# Patient Record
Sex: FEMALE | Race: BLACK OR AFRICAN AMERICAN | Employment: OTHER | URBAN - METROPOLITAN AREA
[De-identification: names, ages, dates, MRNs, and addresses within clinical notes are randomized per-mention and may not be internally consistent; named-entity substitution may affect disease eponyms.]

---

## 2020-08-06 ENCOUNTER — HOSPITAL ENCOUNTER (OUTPATIENT)
Age: 75
Discharge: HOME | End: 2020-08-06
Payer: COMMERCIAL

## 2020-08-06 LAB
INR BLD: 1.66 (ref 0.86–1.14)
PROTHROMBIN TIME: 19.4 SEC (ref 10–13.2)

## 2020-08-06 PROCEDURE — 85610 PROTHROMBIN TIME: CPT

## 2020-08-06 PROCEDURE — 36415 COLL VENOUS BLD VENIPUNCTURE: CPT

## 2021-05-10 ENCOUNTER — APPOINTMENT (OUTPATIENT)
Dept: GENERAL RADIOLOGY | Age: 76
DRG: 189 | End: 2021-05-10
Payer: COMMERCIAL

## 2021-05-10 ENCOUNTER — HOSPITAL ENCOUNTER (INPATIENT)
Age: 76
LOS: 3 days | Discharge: ANOTHER HOSPITAL | DRG: 189 | End: 2021-05-13
Attending: STUDENT IN AN ORGANIZED HEALTH CARE EDUCATION/TRAINING PROGRAM | Admitting: INTERNAL MEDICINE
Payer: COMMERCIAL

## 2021-05-10 DIAGNOSIS — J44.1 COPD EXACERBATION (HCC): ICD-10-CM

## 2021-05-10 DIAGNOSIS — E87.29 RESPIRATORY ACIDOSIS: ICD-10-CM

## 2021-05-10 DIAGNOSIS — R06.00 DYSPNEA AND RESPIRATORY ABNORMALITIES: Primary | ICD-10-CM

## 2021-05-10 DIAGNOSIS — R06.89 DYSPNEA AND RESPIRATORY ABNORMALITIES: Primary | ICD-10-CM

## 2021-05-10 DIAGNOSIS — R09.02 HYPOXIA: ICD-10-CM

## 2021-05-10 PROBLEM — J96.01 ACUTE RESPIRATORY FAILURE WITH HYPOXIA AND HYPERCAPNIA (HCC): Status: ACTIVE | Noted: 2021-05-10

## 2021-05-10 PROBLEM — I82.409 DVT (DEEP VENOUS THROMBOSIS) (HCC): Status: ACTIVE | Noted: 2021-05-10

## 2021-05-10 PROBLEM — Z99.2 ESRD ON DIALYSIS (HCC): Status: ACTIVE | Noted: 2021-05-10

## 2021-05-10 PROBLEM — E87.5 HYPERKALEMIA: Status: ACTIVE | Noted: 2021-05-10

## 2021-05-10 PROBLEM — E66.9 OBESITY (BMI 30-39.9): Status: ACTIVE | Noted: 2021-05-10

## 2021-05-10 PROBLEM — I73.9 PVD (PERIPHERAL VASCULAR DISEASE) (HCC): Status: ACTIVE | Noted: 2021-05-10

## 2021-05-10 PROBLEM — R77.8 ELEVATED TROPONIN: Status: ACTIVE | Noted: 2021-05-10

## 2021-05-10 PROBLEM — R13.10 DYSPHAGIA: Status: ACTIVE | Noted: 2021-05-10

## 2021-05-10 PROBLEM — E11.9 DM2 (DIABETES MELLITUS, TYPE 2) (HCC): Status: ACTIVE | Noted: 2021-05-10

## 2021-05-10 PROBLEM — J96.02 ACUTE RESPIRATORY FAILURE WITH HYPOXIA AND HYPERCAPNIA (HCC): Status: ACTIVE | Noted: 2021-05-10

## 2021-05-10 PROBLEM — I70.1 RENAL ARTERY STENOSIS (HCC): Status: ACTIVE | Noted: 2021-05-10

## 2021-05-10 PROBLEM — I25.10 CAD (CORONARY ARTERY DISEASE): Status: ACTIVE | Noted: 2021-05-10

## 2021-05-10 PROBLEM — I26.99 PULMONARY EMBOLISM (HCC): Status: ACTIVE | Noted: 2021-05-10

## 2021-05-10 PROBLEM — N18.6 ESRD ON DIALYSIS (HCC): Status: ACTIVE | Noted: 2021-05-10

## 2021-05-10 PROBLEM — J69.0 ASPIRATION PNEUMONITIS (HCC): Status: ACTIVE | Noted: 2021-05-10

## 2021-05-10 PROBLEM — E87.70 VOLUME OVERLOAD: Status: ACTIVE | Noted: 2021-05-10

## 2021-05-10 LAB
A/G RATIO: 1.2 (ref 1.1–2.2)
ALBUMIN SERPL-MCNC: 4.2 G/DL (ref 3.4–5)
ALP BLD-CCNC: 95 U/L (ref 40–129)
ALT SERPL-CCNC: 21 U/L (ref 10–40)
ANION GAP SERPL CALCULATED.3IONS-SCNC: 14 MMOL/L (ref 3–16)
AST SERPL-CCNC: 35 U/L (ref 15–37)
BASE EXCESS ARTERIAL: -2.8 MMOL/L (ref -3–3)
BASE EXCESS VENOUS: -4.5 MMOL/L (ref -3–3)
BASOPHILS ABSOLUTE: 0 K/UL (ref 0–0.2)
BASOPHILS RELATIVE PERCENT: 0.5 %
BILIRUB SERPL-MCNC: 0.4 MG/DL (ref 0–1)
BUN BLDV-MCNC: 68 MG/DL (ref 7–20)
CALCIUM SERPL-MCNC: 9.4 MG/DL (ref 8.3–10.6)
CARBOXYHEMOGLOBIN ARTERIAL: 0.5 % (ref 0–1.5)
CARBOXYHEMOGLOBIN: 1.4 % (ref 0–1.5)
CHLORIDE BLD-SCNC: 102 MMOL/L (ref 99–110)
CO2: 21 MMOL/L (ref 21–32)
CREAT SERPL-MCNC: 9.7 MG/DL (ref 0.6–1.2)
EKG ATRIAL RATE: 100 BPM
EKG DIAGNOSIS: NORMAL
EKG P AXIS: 50 DEGREES
EKG P-R INTERVAL: 146 MS
EKG Q-T INTERVAL: 336 MS
EKG QRS DURATION: 74 MS
EKG QTC CALCULATION (BAZETT): 433 MS
EKG R AXIS: -4 DEGREES
EKG T AXIS: 56 DEGREES
EKG VENTRICULAR RATE: 100 BPM
EOSINOPHILS ABSOLUTE: 0.9 K/UL (ref 0–0.6)
EOSINOPHILS RELATIVE PERCENT: 8.8 %
ESTIMATED AVERAGE GLUCOSE: 116.9 MG/DL
GFR AFRICAN AMERICAN: 5
GFR NON-AFRICAN AMERICAN: 4
GLOBULIN: 3.4 G/DL
GLUCOSE BLD-MCNC: 118 MG/DL (ref 70–99)
GLUCOSE BLD-MCNC: 187 MG/DL (ref 70–99)
GLUCOSE BLD-MCNC: 75 MG/DL (ref 70–99)
HBA1C MFR BLD: 5.7 %
HBV SURFACE AB TITR SER: <3.5 MIU/ML
HCO3 ARTERIAL: 26.5 MMOL/L (ref 21–29)
HCO3 VENOUS: 24.5 MMOL/L (ref 23–29)
HCT VFR BLD CALC: 36.4 % (ref 36–48)
HEMOGLOBIN, ART, EXTENDED: 13 G/DL (ref 12–16)
HEMOGLOBIN: 11.7 G/DL (ref 12–16)
HEPATITIS B SURFACE ANTIGEN INTERPRETATION: NORMAL
INR BLD: 1.38 (ref 0.86–1.14)
LACTIC ACID, SEPSIS: 1.4 MMOL/L (ref 0.4–1.9)
LYMPHOCYTES ABSOLUTE: 3.5 K/UL (ref 1–5.1)
LYMPHOCYTES RELATIVE PERCENT: 35.1 %
MCH RBC QN AUTO: 27.9 PG (ref 26–34)
MCHC RBC AUTO-ENTMCNC: 32.1 G/DL (ref 31–36)
MCV RBC AUTO: 86.9 FL (ref 80–100)
METHEMOGLOBIN ARTERIAL: 0.5 %
METHEMOGLOBIN VENOUS: 0.5 %
MONOCYTES ABSOLUTE: 0.9 K/UL (ref 0–1.3)
MONOCYTES RELATIVE PERCENT: 9 %
NEUTROPHILS ABSOLUTE: 4.6 K/UL (ref 1.7–7.7)
NEUTROPHILS RELATIVE PERCENT: 46.6 %
O2 CONTENT ARTERIAL: 18 ML/DL
O2 CONTENT, VEN: 17 VOL %
O2 SAT, ARTERIAL: 98.1 %
O2 SAT, VEN: 98 %
O2 THERAPY: ABNORMAL
O2 THERAPY: ABNORMAL
PCO2 ARTERIAL: 66.7 MMHG (ref 35–45)
PCO2, VEN: 63.6 MMHG (ref 40–50)
PDW BLD-RTO: 19.4 % (ref 12.4–15.4)
PERFORMED ON: ABNORMAL
PERFORMED ON: NORMAL
PH ARTERIAL: 7.21 (ref 7.35–7.45)
PH VENOUS: 7.2 (ref 7.35–7.45)
PLATELET # BLD: 143 K/UL (ref 135–450)
PMV BLD AUTO: 7.7 FL (ref 5–10.5)
PO2 ARTERIAL: 138 MMHG (ref 75–108)
PO2, VEN: 143 MMHG (ref 25–40)
POTASSIUM REFLEX MAGNESIUM: 5.4 MMOL/L (ref 3.5–5.1)
PRO-BNP: ABNORMAL PG/ML (ref 0–449)
PROCALCITONIN: 0.36 NG/ML (ref 0–0.15)
PROTHROMBIN TIME: 16.1 SEC (ref 10–13.2)
RBC # BLD: 4.19 M/UL (ref 4–5.2)
SARS-COV-2, NAAT: NOT DETECTED
SODIUM BLD-SCNC: 137 MMOL/L (ref 136–145)
TCO2 ARTERIAL: 63.9 MMOL/L
TCO2 CALC VENOUS: 59 MMOL/L
TOTAL PROTEIN: 7.6 G/DL (ref 6.4–8.2)
TROPONIN: 0.06 NG/ML
TROPONIN: 0.13 NG/ML
TROPONIN: 0.19 NG/ML
TROPONIN: 0.24 NG/ML
WBC # BLD: 9.9 K/UL (ref 4–11)

## 2021-05-10 PROCEDURE — 94660 CPAP INITIATION&MGMT: CPT

## 2021-05-10 PROCEDURE — 2580000003 HC RX 258: Performed by: INTERNAL MEDICINE

## 2021-05-10 PROCEDURE — 96365 THER/PROPH/DIAG IV INF INIT: CPT

## 2021-05-10 PROCEDURE — 93005 ELECTROCARDIOGRAM TRACING: CPT | Performed by: STUDENT IN AN ORGANIZED HEALTH CARE EDUCATION/TRAINING PROGRAM

## 2021-05-10 PROCEDURE — 6370000000 HC RX 637 (ALT 250 FOR IP): Performed by: INTERNAL MEDICINE

## 2021-05-10 PROCEDURE — 87635 SARS-COV-2 COVID-19 AMP PRB: CPT

## 2021-05-10 PROCEDURE — 71045 X-RAY EXAM CHEST 1 VIEW: CPT

## 2021-05-10 PROCEDURE — 92610 EVALUATE SWALLOWING FUNCTION: CPT

## 2021-05-10 PROCEDURE — 83036 HEMOGLOBIN GLYCOSYLATED A1C: CPT

## 2021-05-10 PROCEDURE — 85610 PROTHROMBIN TIME: CPT

## 2021-05-10 PROCEDURE — 6360000002 HC RX W HCPCS: Performed by: STUDENT IN AN ORGANIZED HEALTH CARE EDUCATION/TRAINING PROGRAM

## 2021-05-10 PROCEDURE — 83880 ASSAY OF NATRIURETIC PEPTIDE: CPT

## 2021-05-10 PROCEDURE — 94761 N-INVAS EAR/PLS OXIMETRY MLT: CPT

## 2021-05-10 PROCEDURE — 99223 1ST HOSP IP/OBS HIGH 75: CPT | Performed by: INTERNAL MEDICINE

## 2021-05-10 PROCEDURE — 92526 ORAL FUNCTION THERAPY: CPT

## 2021-05-10 PROCEDURE — 86706 HEP B SURFACE ANTIBODY: CPT

## 2021-05-10 PROCEDURE — 36415 COLL VENOUS BLD VENIPUNCTURE: CPT

## 2021-05-10 PROCEDURE — 87340 HEPATITIS B SURFACE AG IA: CPT

## 2021-05-10 PROCEDURE — 93010 ELECTROCARDIOGRAM REPORT: CPT | Performed by: INTERNAL MEDICINE

## 2021-05-10 PROCEDURE — 84145 PROCALCITONIN (PCT): CPT

## 2021-05-10 PROCEDURE — 94640 AIRWAY INHALATION TREATMENT: CPT

## 2021-05-10 PROCEDURE — 82803 BLOOD GASES ANY COMBINATION: CPT

## 2021-05-10 PROCEDURE — 6360000002 HC RX W HCPCS: Performed by: INTERNAL MEDICINE

## 2021-05-10 PROCEDURE — 86704 HEP B CORE ANTIBODY TOTAL: CPT

## 2021-05-10 PROCEDURE — 5A1D70Z PERFORMANCE OF URINARY FILTRATION, INTERMITTENT, LESS THAN 6 HOURS PER DAY: ICD-10-PCS | Performed by: INTERNAL MEDICINE

## 2021-05-10 PROCEDURE — 99284 EMERGENCY DEPT VISIT MOD MDM: CPT

## 2021-05-10 PROCEDURE — 6370000000 HC RX 637 (ALT 250 FOR IP): Performed by: STUDENT IN AN ORGANIZED HEALTH CARE EDUCATION/TRAINING PROGRAM

## 2021-05-10 PROCEDURE — 83605 ASSAY OF LACTIC ACID: CPT

## 2021-05-10 PROCEDURE — 2000000000 HC ICU R&B

## 2021-05-10 PROCEDURE — 84484 ASSAY OF TROPONIN QUANT: CPT

## 2021-05-10 PROCEDURE — 80053 COMPREHEN METABOLIC PANEL: CPT

## 2021-05-10 PROCEDURE — 2700000000 HC OXYGEN THERAPY PER DAY

## 2021-05-10 PROCEDURE — 85025 COMPLETE CBC W/AUTO DIFF WBC: CPT

## 2021-05-10 PROCEDURE — 90935 HEMODIALYSIS ONE EVALUATION: CPT

## 2021-05-10 PROCEDURE — 96375 TX/PRO/DX INJ NEW DRUG ADDON: CPT

## 2021-05-10 RX ORDER — INSULIN LISPRO 100 [IU]/ML
0-12 INJECTION, SOLUTION INTRAVENOUS; SUBCUTANEOUS
Status: DISCONTINUED | OUTPATIENT
Start: 2021-05-10 | End: 2021-05-14 | Stop reason: HOSPADM

## 2021-05-10 RX ORDER — DEXAMETHASONE SODIUM PHOSPHATE 10 MG/ML
10 INJECTION, SOLUTION INTRAMUSCULAR; INTRAVENOUS ONCE
Status: COMPLETED | OUTPATIENT
Start: 2021-05-10 | End: 2021-05-10

## 2021-05-10 RX ORDER — DEXTROSE MONOHYDRATE 25 G/50ML
12.5 INJECTION, SOLUTION INTRAVENOUS PRN
Status: DISCONTINUED | OUTPATIENT
Start: 2021-05-10 | End: 2021-05-14 | Stop reason: HOSPADM

## 2021-05-10 RX ORDER — NICOTINE POLACRILEX 4 MG
15 LOZENGE BUCCAL PRN
Status: DISCONTINUED | OUTPATIENT
Start: 2021-05-10 | End: 2021-05-14 | Stop reason: HOSPADM

## 2021-05-10 RX ORDER — SODIUM CHLORIDE 0.9 % (FLUSH) 0.9 %
5-40 SYRINGE (ML) INJECTION EVERY 12 HOURS SCHEDULED
Status: DISCONTINUED | OUTPATIENT
Start: 2021-05-10 | End: 2021-05-14 | Stop reason: HOSPADM

## 2021-05-10 RX ORDER — BUDESONIDE AND FORMOTEROL FUMARATE DIHYDRATE 160; 4.5 UG/1; UG/1
2 AEROSOL RESPIRATORY (INHALATION) 2 TIMES DAILY
Status: DISCONTINUED | OUTPATIENT
Start: 2021-05-10 | End: 2021-05-14 | Stop reason: HOSPADM

## 2021-05-10 RX ORDER — ACETAMINOPHEN 650 MG/1
650 SUPPOSITORY RECTAL EVERY 6 HOURS PRN
Status: DISCONTINUED | OUTPATIENT
Start: 2021-05-10 | End: 2021-05-14 | Stop reason: HOSPADM

## 2021-05-10 RX ORDER — SODIUM CHLORIDE 0.9 % (FLUSH) 0.9 %
5-40 SYRINGE (ML) INJECTION PRN
Status: DISCONTINUED | OUTPATIENT
Start: 2021-05-10 | End: 2021-05-14 | Stop reason: HOSPADM

## 2021-05-10 RX ORDER — INSULIN LISPRO 100 [IU]/ML
0-6 INJECTION, SOLUTION INTRAVENOUS; SUBCUTANEOUS NIGHTLY
Status: DISCONTINUED | OUTPATIENT
Start: 2021-05-10 | End: 2021-05-14 | Stop reason: HOSPADM

## 2021-05-10 RX ORDER — HEPARIN SODIUM 5000 [USP'U]/ML
5000 INJECTION, SOLUTION INTRAVENOUS; SUBCUTANEOUS EVERY 8 HOURS SCHEDULED
Status: DISCONTINUED | OUTPATIENT
Start: 2021-05-10 | End: 2021-05-12

## 2021-05-10 RX ORDER — PROMETHAZINE HYDROCHLORIDE 25 MG/1
12.5 TABLET ORAL EVERY 6 HOURS PRN
Status: DISCONTINUED | OUTPATIENT
Start: 2021-05-10 | End: 2021-05-14 | Stop reason: HOSPADM

## 2021-05-10 RX ORDER — ALBUTEROL SULFATE 90 UG/1
2 AEROSOL, METERED RESPIRATORY (INHALATION) EVERY 6 HOURS PRN
Status: DISCONTINUED | OUTPATIENT
Start: 2021-05-10 | End: 2021-05-10

## 2021-05-10 RX ORDER — METHYLPREDNISOLONE SODIUM SUCCINATE 40 MG/ML
40 INJECTION, POWDER, LYOPHILIZED, FOR SOLUTION INTRAMUSCULAR; INTRAVENOUS EVERY 6 HOURS
Status: DISCONTINUED | OUTPATIENT
Start: 2021-05-10 | End: 2021-05-10

## 2021-05-10 RX ORDER — POLYETHYLENE GLYCOL 3350 17 G/17G
17 POWDER, FOR SOLUTION ORAL DAILY PRN
Status: DISCONTINUED | OUTPATIENT
Start: 2021-05-10 | End: 2021-05-14 | Stop reason: HOSPADM

## 2021-05-10 RX ORDER — WARFARIN SODIUM 5 MG/1
5 TABLET ORAL
Status: COMPLETED | OUTPATIENT
Start: 2021-05-10 | End: 2021-05-10

## 2021-05-10 RX ORDER — ALBUTEROL SULFATE 90 UG/1
2 AEROSOL, METERED RESPIRATORY (INHALATION) 4 TIMES DAILY
Status: DISCONTINUED | OUTPATIENT
Start: 2021-05-10 | End: 2021-05-11

## 2021-05-10 RX ORDER — ASPIRIN 81 MG/1
81 TABLET, CHEWABLE ORAL DAILY
Status: DISCONTINUED | OUTPATIENT
Start: 2021-05-10 | End: 2021-05-14 | Stop reason: HOSPADM

## 2021-05-10 RX ORDER — ONDANSETRON 2 MG/ML
4 INJECTION INTRAMUSCULAR; INTRAVENOUS EVERY 6 HOURS PRN
Status: DISCONTINUED | OUTPATIENT
Start: 2021-05-10 | End: 2021-05-14 | Stop reason: HOSPADM

## 2021-05-10 RX ORDER — IPRATROPIUM BROMIDE AND ALBUTEROL SULFATE 2.5; .5 MG/3ML; MG/3ML
3 SOLUTION RESPIRATORY (INHALATION) ONCE
Status: COMPLETED | OUTPATIENT
Start: 2021-05-10 | End: 2021-05-10

## 2021-05-10 RX ORDER — SODIUM CHLORIDE 9 MG/ML
25 INJECTION, SOLUTION INTRAVENOUS PRN
Status: DISCONTINUED | OUTPATIENT
Start: 2021-05-10 | End: 2021-05-14 | Stop reason: HOSPADM

## 2021-05-10 RX ORDER — MAGNESIUM SULFATE IN WATER 40 MG/ML
2000 INJECTION, SOLUTION INTRAVENOUS ONCE
Status: COMPLETED | OUTPATIENT
Start: 2021-05-10 | End: 2021-05-10

## 2021-05-10 RX ORDER — ACETAMINOPHEN 325 MG/1
650 TABLET ORAL EVERY 6 HOURS PRN
Status: DISCONTINUED | OUTPATIENT
Start: 2021-05-10 | End: 2021-05-14 | Stop reason: HOSPADM

## 2021-05-10 RX ORDER — WARFARIN SODIUM 7.5 MG/1
3.75 TABLET ORAL DAILY
Status: DISCONTINUED | OUTPATIENT
Start: 2021-05-11 | End: 2021-05-12

## 2021-05-10 RX ORDER — DEXTROSE MONOHYDRATE 50 MG/ML
100 INJECTION, SOLUTION INTRAVENOUS PRN
Status: DISCONTINUED | OUTPATIENT
Start: 2021-05-10 | End: 2021-05-14 | Stop reason: HOSPADM

## 2021-05-10 RX ADMIN — Medication 2 PUFF: at 19:38

## 2021-05-10 RX ADMIN — Medication 10 ML: at 21:13

## 2021-05-10 RX ADMIN — MAGNESIUM SULFATE HEPTAHYDRATE 2000 MG: 40 INJECTION, SOLUTION INTRAVENOUS at 05:56

## 2021-05-10 RX ADMIN — HEPARIN SODIUM 5000 UNITS: 5000 INJECTION INTRAVENOUS; SUBCUTANEOUS at 21:12

## 2021-05-10 RX ADMIN — INSULIN LISPRO 1 UNITS: 100 INJECTION, SOLUTION INTRAVENOUS; SUBCUTANEOUS at 21:12

## 2021-05-10 RX ADMIN — IPRATROPIUM BROMIDE AND ALBUTEROL SULFATE 3 AMPULE: .5; 3 SOLUTION RESPIRATORY (INHALATION) at 06:22

## 2021-05-10 RX ADMIN — WARFARIN SODIUM 5 MG: 5 TABLET ORAL at 18:54

## 2021-05-10 RX ADMIN — DEXAMETHASONE SODIUM PHOSPHATE 10 MG: 10 INJECTION, SOLUTION INTRAMUSCULAR; INTRAVENOUS at 05:55

## 2021-05-10 NOTE — H&P
HOSPITALISTS HISTORY AND PHYSICAL    5/10/2021 7:39 AM    Patient Information:  Oumar Mckinley is a 76 y.o. female [id number]  PCP:  [place] (Tel: None )    Chief complaint:    Chief Complaint   Patient presents with    Shortness of Breath     Pt. arrived to the ED via [facility]. Pt. was headed to dialysis this am and pt. states daughter called 911 because I couldn't breathe. Pt. arrived via non rebreather o2-95% pt. diaphoretic. Pt. able to follow verbal commands       History of Present Illness:  Carolyn Gracia is a 76 y.o. female with historyt of CAD s/p CABG, PAD s/p L MIMA s/p angio and stent, PVD s/p BL common iliac artery stents, ESRD on HD, DVT/PE on Coumadin, HTN, hypothyroidism, GERD, obesity who came to ER with SOB. Patient has been SOB for past few days. Daughter called EMS as patient getting ready to go to HD. Patient was in [place] acute respiratory failure with hypoxia and hypercapnia on arrival to ED. Patient was on 100% NRB. Patient continued to struggle with SOB in ED and placed on Bipap as she was hypercapnic. Rapid COVID negative. Endorses dysphagia and coughing. States she has fevers and chills. No CP, HA, abdominal pain, diarrhea, nausea or vomiting. No syncope. Lives with daughter. Otherwise complete ROS is negative unless listed above. REVIEW OF SYSTEMS:   Pertinent positives as noted in HPI. All other systems were reviewed and are negative. Past Medical History:   has a past medical history of CAD (coronary artery disease), ESRD (end stage renal disease) on dialysis ([facility] [address] Gout, HYPERCHOLESTERAEMIA, Hypertension, and Hypothyroidism. Past Surgical History:   has a past surgical history that includes IR Femoral Popliteal Bypass Graft.      Medications:    Meds as of 5/6/21 outpatient visit  ADVAIR DISKUS 250-50 mcg/dose diskus inhaler INHALE ONE (1) PUFF BY MOUTH TWICE DAILY 1 Inhaler 3    allopurinoL (ZYLOPRIM) 100 MG tablet TAKE 1 TABLET BY MOUTH ONCE DAILY 30 tablet 10    aspirin 81 MG EC tablet Take 1 tablet (81 mg total) by mouth daily. 90 tablet 1    atorvastatin (LIPITOR) 80 MG tablet Take 1 tablet (80 mg total) by mouth at bedtime. 90 tablet 1    blood sugar diagnostic (FREESTYLE LITE STRIPS) Strp 1 strip by Other route 3 times a day. 200 strip 5    calcitRIOL (ROCALTROL) 0.5 MCG capsule TAKE 1 CAPSULE BY MOUTH ON MONDAY, WEDNESDAY AND FRIDAY 39 capsule 0    carvediloL (COREG) 25 MG tablet Take 1 tablet (25 mg total) by mouth 2 times a day. 180 tablet 1    cholecalciferol, vitamin D3, 1000 units tablet TAKE TWO (2) TABLETS BY MOUTH DAILY 60 tablet 10     mg capsule TAKE ONE (1) CAPSULE BY MOUTH TWICE DAILY *EMERGENCY REFILL* 60 capsule 10    epoetin gee-epbx (RETACRIT) 4,000 unit/mL Soln Intravenous 8,000 Units every Monday, Wednesday, and Friday. Indications: Anemia in Chronic Kidney Disease    ezetimibe (ZETIA) 10 mg tablet Take 1 tablet (10 mg total) by mouth daily. 90 tablet 1    ferrous sulfate 325 (65 FE) MG tablet Take 1 tablet (325 mg total) by mouth every other day. 30 tablet 0    folic acid (FOLVITE) 1 MG tablet TAKE 1 TABLET BY MOUTH DAILY 90 tablet 10    hydrALAZINE (APRESOLINE) 100 MG tablet Take 1 tablet (100 mg total) by mouth 2 times a day. 180 tablet 1    HYDROcodone-acetaminophen (NORCO) 5-325 mg per tablet Take 1 tablet by mouth 2 times a day as needed for Pain for up to 30 days. 60 tablet 0    levothyroxine (SYNTHROID) 125 MCG tablet TAKE 1 TABLET BY MOUTH EVERY MORNING BEFORE BREAKFAST 30 tablet 10    lisinopriL (PRINIVIL) 5 MG tablet Take 1 tablet (5 mg total) by mouth daily. 90 tablet 1    NIFEdipine (ADALAT CC) 90 MG 24 hr tablet Take one tablet by mouth daily 90 tablet 1    torsemide (DEMADEX) 20 MG tablet Take 2 tablets (40 mg total) by mouth daily.  180 tablet 2    VENTOLIN HFA 90 mcg/actuation inhaler INHALE ONE (1) PUFF BY MOUTH EVERY 6 HOURS AS NEEDED FOR WHEEZING 18 g 10    warfarin (COUMADIN/JANTOVEN) 2.5 MG tablet TAKE AS DIRECTED PER ANTICOAGULATION CLINIC. CURRENT DOSE IS 2 TABLETS ON TUESDAY AND 1 TABLET ON ALL OTHER DAYS OF THE WEEK 102 tablet 1       Allergies:  No Known Allergies     Social History:  Patient Lives with daughter   reports that she has been smoking cigarettes. She has been smoking about 1.00 pack per day. She does not have any smokeless tobacco history on file. She reports current alcohol use. Family History:  family history includes Stroke in her mother. Physical Exam:  /88   Pulse 88   Temp 97.6 °F (36.4 °C) (Oral)   Resp 22   Ht 5' (1.524 m)   Wt 182 lb (82.6 kg)   SpO2 100%   BMI 35.54 kg/m²     General appearance:  Appears comfortable. Well nourished, Bipap, awake, comfortable on Bipap  Eyes: Sclera clear, pupils equal  ENT: Moist mucus membranes, no thrush. Trachea midline. Cardiovascular: Regular rhythm, normal S1, S2. No murmur, gallop, rub. No edema in lower extremities  Respiratory: BL rales, rhonchi and wheezing noted. Gastrointestinal: Abdomen soft, non-tender, not distended, normal bowel sounds  Musculoskeletal: LUE AVF  Neurology: Grossly intact. Alert and oriented in time, place and person. No speech or motor deficits  Psychiatry: Appropriate affect.  Not agitated  Skin: Warm, dry, normal turgor, no rash  Brisk capillary refill, peripheral pulses palpable   Labs:  CBC:   Lab Results   Component Value Date    WBC 9.9 05/10/2021    RBC 4.19 05/10/2021    HGB 11.7 05/10/2021    HCT 36.4 05/10/2021    MCV 86.9 05/10/2021    MCH 27.9 05/10/2021    MCHC 32.1 05/10/2021    RDW 19.4 05/10/2021     05/10/2021    MPV 7.7 05/10/2021     BMP:    Lab Results   Component Value Date     05/10/2021    K 5.4 05/10/2021     05/10/2021    CO2 21 05/10/2021    BUN 68 05/10/2021    CREATININE 9.7 05/10/2021    CALCIUM 9.4 05/10/2021    GFRAA 5 05/10/2021    LABGLOM 4 05/10/2021    GLUCOSE 118 05/10/2021     XR CHEST PORTABLE   Final Result   Findings consistent with mild congestive failure. Problem List  Principal Problem:    Acute respiratory failure with hypoxia and hypercapnia (HCC)  Active Problems:    COPD with acute exacerbation (HCC)    Volume overload    ESRD on dialysis ([facility] [address]    CAD (coronary artery disease)    PVD (peripheral vascular disease) (HCC)    Renal artery stenosis (HCC)    Obesity (BMI 30-39. 9)    DVT (deep venous thrombosis) (HCC)    Pulmonary embolism (HCC)    Hyperkalemia    Elevated troponin    DM2 (diabetes mellitus, type 2) (HCC)    Aspiration pneumonitis (HCC)    Dysphagia  Resolved Problems:    * No resolved hospital problems. *        Assessment/Plan:   1. Admit to ICU  2. Bipap  3. Renal consult for ESRD on HD  4. [facility] consult for critical care  5. Solumedrol 40 mg IV q6h  6. Atrovent and Albuterol HFA   7. Zosyn IV for aspiration pneumonitis  8. SLP/PT/OT  9. SSI  10. Droplet plus isolation  11. Check respiratory panel with COVID  12. Symbicort  13. Might benefit from MBS  14. NPO until off Bipap  15. PharmD consult for Coumadin  16. Check sputum cx, Legionella and Strep U Ag  17. Cardiology consult for elevated troponin, CAD  18. Serial troponin  19. Check Echo given SOB and elevated troponin  20. Will reconcile home meds when updated       DVT prophylaxis Hep SQ/Coumadin  Code status Full code  Diet NPO  IV access Peripheral  Brown Catheter No    Admit as inpatient. I anticipate hospitalization spanning more than two midnights for investigation and treatment of the above medically necessary diagnoses. Discussed with patient and Dr Domonique Umanzor ([facility]). Will monitor closely in ICU.       Niki Madison MD    5/10/2021 7:39 AM

## 2021-05-10 NOTE — ED NOTES
Bed: 01  Expected date:   Expected time:   Means of arrival:   Comments:  Lauro Hawley, RN  05/10/21 [id number]

## 2021-05-10 NOTE — CARE COORDINATION
Discharge Planning Assessment    RN/SW discharge planner met with patient/ (and family member) to discuss reason for admission, current living situation, and potential needs at the time of discharge    Demographics/Insurance verified:   YES    Current type of dwelling:    house    Patient from ECF/SW confirmed with:    N/A    Living arrangements:    Adult Grandchildren live with her    Level of function/Support:  independent    PCP:  Dr. Pia Romero    Last Visit to PCP:   3/3/21    DME: None - but states she needs O2 at home    Active with any community resources/agencies/skilled home care: YES - a nurse from [facility] comes every 2 weeks    [address] [place] [address] Road  [address] [place], [place] [id number] [address] [facility]  [address]    Ph:  1 [phone]    Schedule:  M,W,F  2:15 pm    Transport - [facility] arranges it    Medication compliance issues:  NO    Financial issues that could impact healthcare: NO    Tentative discharge plan:  Home - may need HHC and O2    Discussed with patient and/or family that on the day of discharge home tentative time of discharge will be between 10 AM and noon. Transportation at the time of discharge:  Medical transport will be needed. States her daughter is HCPOA but doesn't have the paperwork with her right now - [facility] - [phone]    Case management will follow progress and assist with discharge planning as needed.     Lorenzo Allred RN BSN  Case Management  [phone]

## 2021-05-10 NOTE — PROGRESS NOTES
MD [place] Bailey MD Providence Hoe, MD                Office: [phone]                      Fax: [phone]          Inpatient Dialysis Progress Note:     PATIENT NAME: Nelda Escobedo  :   MRN: [id number]    Indication for Dialysis: ESRD    Patient seen on dialysis treatment. Tolerating treatment  Fairly well    DW 63,   Pre-HD: 60   BP 160s/80s on start  -UF 1L as tolerated     Vitals:    05/10/21 1140   BP: (!) 163/82   Pulse: 98   Resp: 24   Temp: 96.7 °F (35.9 °C)   SpO2:        Awake, co-operative    Neck: Supple. no JVD. Cardiac: S1 and S2+, No pericardial rub. Chest: Normal respiratory effort,   Rales. No rhonchi  Ext :  LE Edema +, no cynosis    Vascular Access:  Permanent Vascular access:  Left upper extremity AV: Access site demonstrates: normal thrill/bruit; no pseudoaneurysm, redness, pain or discharge         Labs Reviewed  by me   Labs   Lab Results   Component Value Date    CREATININE 9.7 (HH) 05/10/2021    BUN 68 (H) 05/10/2021     05/10/2021    K 5.4 (H) 05/10/2021     05/10/2021    CO2 21 05/10/2021     Lab Results   Component Value Date    WBC 9.9 05/10/2021    HGB 11.7 (L) 05/10/2021    HCT 36.4 05/10/2021    MCV 86.9 05/10/2021     05/10/2021       Dialysis Treatment and Prescription reviewed    RX:  See dialysis flowsheet for specifics on access, blood flow rate, dialysate baths, duration of dialysis, anticoagulation and other technical information. COMMENTS:  Stable on dialysis. Continue to Target dry weight and clearance. Monitor closely for any hypotension    : Tolerating dialysis well, with no complications. Hypotension on dialysis-stable to improved. Good flow access. :Anemia. Stable. Continue Aransep. :Fluid Overload. Fluid removal as tolerated with dialysis. :Stable from Renal.  Continue out patient Dialysis treatments. Please refer to the orders.    Dialysis treatment

## 2021-05-10 NOTE — CONSULTS
[facility] Pulmonary and Critical Care   Consult Note      Reason for Consult: Acute hypoxemic respiratory failure, acute pulmonary edema, end-stage renal disease  Requesting Physician: Dr. Bryanna Etienne:   [facility]:                The patient is a 76 y.o. female with significant past medical history of:      Diagnosis Date    CAD (coronary artery disease)     ESRD (end stage renal disease) on dialysis ([address]     Gout     HYPERCHOLESTERAEMIA     Hypertension     Hypothyroidism      Patient presented to the emergency department today instead of going to her regularly scheduled hemodialysis because she was feeling short of breath. She was hypoxemic in the ED and initially required BiPAP mask ventilation. By the time I saw her in ICU, she was tolerating 3 L/min nasal cannula oxygen supplement. The patient was alert and oriented and stated her breathing felt much better. She has not yet had hemodialysis today. Her normal dialysis is Monday, Wednesday and Friday. She did not miss dialysis treatment. She denies fevers, chills or productive cough. She has had no sick exposures.       Past Surgical History:        Procedure Laterality Date    IR FEMORAL POPLITEAL BYPASS GRAFT       Current Medications:    Current Facility-Administered Medications: sodium chloride flush 0.9 % injection 5-40 mL, 5-40 mL, Intravenous, 2 times per day  sodium chloride flush 0.9 % injection 5-40 mL, 5-40 mL, Intravenous, PRN  0.9 % sodium chloride infusion, 25 mL, Intravenous, PRN  heparin (porcine) injection 5,000 Units, 5,000 Units, Subcutaneous, 3 times per day  promethazine (PHENERGAN) tablet 12.5 mg, 12.5 mg, Oral, Q6H PRN **OR** ondansetron (ZOFRAN) injection 4 mg, 4 mg, Intravenous, Q6H PRN  polyethylene glycol (GLYCOLAX) packet 17 g, 17 g, Oral, Daily PRN  acetaminophen (TYLENOL) tablet 650 mg, 650 mg, Oral, Q6H PRN **OR** acetaminophen (TYLENOL) suppository 650 mg, 650 mg, Rectal, Q6H PRN  glucose (GLUTOSE) 40 % oral gel 15 g, 15 g, Oral, PRN  dextrose 50 % IV solution, 12.5 g, Intravenous, PRN  glucagon (rDNA) injection 1 mg, 1 mg, Intramuscular, PRN  dextrose 5 % solution, 100 mL/hr, Intravenous, PRN  insulin lispro (1 Unit Dial) 0-12 Units, 0-12 Units, Subcutaneous, TID WC  insulin lispro (1 Unit Dial) 0-6 Units, 0-6 Units, Subcutaneous, Nightly  aspirin chewable tablet 81 mg, 81 mg, Oral, Daily  budesonide-formoterol (SYMBICORT) 160-4.5 MCG/ACT inhaler 2 puff, 2 puff, Inhalation, BID  ipratropium (ATROVENT HFA) 17 MCG/ACT inhaler 2 puff, 2 puff, Inhalation, 4x daily  perflutren lipid microspheres (DEFINITY) injection 1.65 mg, 1.5 mL, Intravenous, ONCE PRN  albuterol sulfate  (90 Base) MCG/ACT inhaler 2 puff, 2 puff, Inhalation, 4x daily    No Known Allergies    Social History:    TOBACCO:   reports that she has been smoking cigarettes. She has been smoking about 1.00 pack per day. She does not have any smokeless tobacco history on file. ETOH:   reports current alcohol use. Patient currently lives independently  Environmental/chemical exposure: None known    Family History:       Problem Relation Age of Onset    Stroke Mother      REVIEW OF SYSTEMS:    CONSTITUTIONAL:  negative for fevers, chills, diaphoresis, activity change, appetite change, fatigue, night sweats and unexpected weight change.    EYES:  negative for blurred vision, eye discharge, visual disturbance and icterus  HEENT:  negative for hearing loss, tinnitus, ear drainage, sinus pressure, nasal congestion, epistaxis and snoring  RESPIRATORY:  See HPI  CARDIOVASCULAR:  negative for chest pain, palpitations, exertional chest pressure/discomfort, edema, syncope  GASTROINTESTINAL:  negative for nausea, vomiting, diarrhea, constipation, blood in stool and abdominal pain  GENITOURINARY:  negative for frequency, dysuria, urinary incontinence, decreased urine volume, and hematuria  HEMATOLOGIC/LYMPHATIC:  negative for easy bruising, bleeding and lymphadenopathy  ALLERGIC/IMMUNOLOGIC:  negative for recurrent infections, angioedema, anaphylaxis and drug reactions  ENDOCRINE:  negative for weight changes and diabetic symptoms including polyuria, polydipsia and polyphagia  MUSCULOSKELETAL:  negative for  pain, joint swelling, decreased range of motion and muscle weakness  NEUROLOGICAL:  negative for headaches, slurred speech, unilateral weakness  PSYCHIATRIC/BEHAVIORAL: negative for hallucinations, behavioral problems, confusion and agitation. Objective:   PHYSICAL EXAM:      VITALS:  BP (!) 163/82   Pulse 98   Temp 96.7 °F (35.9 °C)   Resp 24   Ht 5' (1.524 m)   Wt 133 lb 6.1 oz (60.5 kg)   SpO2 100%   BMI 26.05 kg/m²      24HR INTAKE/OUTPUT:  No intake or output data in the 24 hours ending 05/10/21 1342  CONSTITUTIONAL:  awake, alert, cooperative, no apparent distress, and appears stated age  NECK:  Supple, symmetrical, trachea midline, no adenopathy, thyroid symmetric, not enlarged and no tenderness, skin normal  LUNGS:  no increased work of breathing and faint basilar crackles to auscultation. No accessory muscle use  CARDIOVASCULAR: S1 and S2, no edema and no JVD  ABDOMEN:  normal bowel sounds, non-distended and no masses palpated, and no tenderness to palpation. No hepatospleenomegaly  LYMPHADENOPATHY:  no axillary or supraclavicular adenopathy. No cervical adnenopathy  PSYCHIATRIC: Oriented to person place and time. No obvious depression or anxiety. MUSCULOSKELETAL: No obvious misalignment or effusion of the joints. No clubbing, cyanosis of the digits. SKIN:  normal skin color, texture, turgor and no redness, warmth, or swelling.  No palpable nodules    DATA:    Old records have been reviewed    CBC:  Recent Labs     05/10/21  0602   WBC 9.9   RBC 4.19   HGB 11.7*   HCT 36.4      MCV 86.9   MCH 27.9   MCHC 32.1   RDW 19.4*      BMP:  Recent Labs     05/10/21  0602      K 5.4*      CO2 21   BUN 68*   CREATININE 9.7* CALCIUM 9.4   GLUCOSE 118*      ABG:  Recent Labs     05/10/21  0602   PHART 7.207*   [id number] 66.7*   PO2ART 138.0*   [id number] 26.5   [id number] 98.1   BEART -2.8     Procalcitonin  Recent Labs     05/10/21  0602   PROCAL 0.36*       No results found for: BNP  Lab Results   Component Value Date    TROPONINI 0.13 (H) 05/10/2021           Radiology Review:  All pertinent images / reports were reviewed as a part of this visit. Assessment:     1. Acute hypoxemic respiratory failure  2. Acute pulmonary edema  3.  End-stage renal disease on hemodialysis      Plan:     I have reviewed laboratories, medical records and images for this visit  Chest x-ray shows mild pulmonary edema  Procalcitonin is elevated but she does not have a fever or leukocytosis  Doubt that she has pneumonia  I think we can stop antibiotics  She mostly needs hemodialysis  Rapid Covid negative

## 2021-05-10 NOTE — ED PROVIDER NOTES
Emergency Department Encounter  Location: [address]    Patient: Jesus Meier  MRN: [id number]  : 1945  Date of evaluation: 5/10/2021  ED Provider: Yaw Snow MD      Jessu Meier was checked out to me by Dr. Jennifer Gonzales 600. Please see his/her initial documentation for details of the patient's initial ED presentation, physical exam and completed studies. In brief, Jesus Meier is a 76 y.o. female that presented to the emergency department with acute onset SOB, diaphoresis. due for dialysis this am. Placed on bipap with improved work of breathing.      Hx ESRD, CABG    I have reviewed and interpreted all of the currently available lab results and diagnostics from this visit:    Labs  Results for orders placed or performed during the hospital encounter of 05/10/21   SARS-CoV-2 NAAT (Rapid)    Specimen: Nasopharyngeal Swab   Result Value Ref Range    SARS-CoV-2, NAAT Not Detected Not Detected   CBC Auto Differential   Result Value Ref Range    WBC 9.9 4.0 - 11.0 K/uL    RBC 4.19 4.00 - 5.20 M/uL    Hemoglobin 11.7 (L) 12.0 - 16.0 g/dL    Hematocrit 36.4 36.0 - 48.0 %    MCV 86.9 80.0 - 100.0 fL    MCH 27.9 26.0 - 34.0 pg    MCHC 32.1 31.0 - 36.0 g/dL    RDW 19.4 (H) 12.4 - 15.4 %    Platelets 915 410 - 227 K/uL    MPV 7.7 5.0 - 10.5 fL    Neutrophils % 46.6 %    Lymphocytes % 35.1 %    Monocytes % 9.0 %    Eosinophils % 8.8 %    Basophils % 0.5 %    Neutrophils Absolute 4.6 1.7 - 7.7 K/uL    Lymphocytes Absolute 3.5 1.0 - 5.1 K/uL    Monocytes Absolute 0.9 0.0 - 1.3 K/uL    Eosinophils Absolute 0.9 (H) 0.0 - 0.6 K/uL    Basophils Absolute 0.0 0.0 - 0.2 K/uL   Comprehensive Metabolic Panel w/ Reflex to MG   Result Value Ref Range    Sodium 137 136 - 145 mmol/L    Potassium reflex Magnesium 5.4 (H) 3.5 - 5.1 mmol/L    Chloride 102 99 - 110 mmol/L    CO2 21 21 - 32 mmol/L    Anion Gap 14 3 - 16    Glucose 118 (H) 70 - 99 mg/dL    BUN 68 (H) 7 - 20 mg/dL CREATININE 9.7 (HH) 0.6 - 1.2 mg/dL    GFR Non-African American 4 (A) >60    GFR  5 (A) >60    Calcium 9.4 8.3 - 10.6 mg/dL    Total Protein 7.6 6.4 - 8.2 g/dL    Albumin 4.2 3.4 - 5.0 g/dL    Albumin/Globulin Ratio 1.2 1.1 - 2.2    Total Bilirubin 0.4 0.0 - 1.0 mg/dL    Alkaline Phosphatase 95 40 - 129 U/L    ALT 21 10 - 40 U/L    AST 35 15 - 37 U/L    Globulin 3.4 g/dL   Troponin   Result Value Ref Range    Troponin 0.06 (H) <0.01 ng/mL   Brain Natriuretic Peptide   Result Value Ref Range    Pro-BNP 29,926 (H) 0 - 449 pg/mL   Lactate, Sepsis   Result Value Ref Range    Lactic Acid, Sepsis 1.4 0.4 - 1.9 mmol/L   Blood Gas, Venous   Result Value Ref Range    pH, Kieran 7.195 (LL) 7.350 - 7.450    pCO2, Kieran 63.6 (H) 40.0 - 50.0 mmHg    pO2, Kieran 143.0 (H) 25 - 40 mmHg    HCO3, Venous 24.5 23.0 - 29.0 mmol/L    Base Excess, Kieran -4.5 (L) -3.0 - 3.0 mmol/L    O2 Sat, Kieran 98 Not Established %    Carboxyhemoglobin 1.4 0.0 - 1.5 %    MetHgb, Kieran 0.5 <1.5 %    TC02 (Calc), Kieran 59 Not Established mmol/L    O2 Content, Kieran 17 Not Established VOL %    O2 Therapy See comment    Procalcitonin   Result Value Ref Range    Procalcitonin 0.36 (H) 0.00 - 0.15 ng/mL   Blood gas, arterial   Result Value Ref Range    pH, Arterial 7.207 (L) 7.350 - 7.450    pCO2, Arterial 66.7 (H) 35.0 - 45.0 mmHg    pO2, Arterial 138.0 (H) 75.0 - 108.0 mmHg    HCO3, Arterial 26.5 21.0 - 29.0 mmol/L    Base Excess, Arterial -2.8 -3.0 - 3.0 mmol/L    Hemoglobin, Art, Extended 13.0 12.0 - 16.0 g/dL    O2 Sat, Arterial 98.1 >92 %    Carboxyhgb, Arterial 0.5 0.0 - 1.5 %    Methemoglobin, Arterial 0.5 <1.5 %    TCO2, Arterial 63.9 Not Established mmol/L    O2 Content, Arterial 18 Not Established mL/dL    O2 Therapy Unknown    Protime-INR   Result Value Ref Range    Protime 16.1 (H) 10.0 - 13.2 sec    INR 1.38 (H) 0.86 - 1.14   EKG 12 Lead   Result Value Ref Range    Ventricular Rate 100 BPM    Atrial Rate 100 BPM    P-R Interval 146 ms    QRS Duration 74 ms    Q-T Interval 336 ms    QTc Calculation (Bazett) 433 ms    P Axis 50 degrees    R Axis -4 degrees    T Axis 56 degrees    Diagnosis Normal sinus rhythmNormal ECG      Labs Reviewed   CBC WITH AUTO DIFFERENTIAL - Abnormal; Notable for the following components:       Result Value    Hemoglobin 11.7 (*)     RDW 19.4 (*)     Eosinophils Absolute 0.9 (*)     All other components within normal limits    Narrative:     Performed at:  [facility] [address], [place], [address] [employer]   Phone [phone]   COMPREHENSIVE METABOLIC PANEL W/ REFLEX TO MG FOR LOW K - Abnormal; Notable for the following components:    Potassium reflex Magnesium 5.4 (*)     Glucose 118 (*)     BUN 68 (*)     CREATININE 9.7 (*)     GFR Non- 4 (*)     GFR  5 (*)     All other components within normal limits    Narrative:     CALL  [facility] tel. [phone],  Chemistry results called to and read back by antonio zamudio rn, 05/10/2021  06:46, by Clarice Villegas  Performed at:  [facility] [address], [place], [address] [employer]   Phone [phone]   TROPONIN - Abnormal; Notable for the following components:    Troponin 0.06 (*)     All other components within normal limits    Narrative:     CALL  [facility] tel. [phone],  Chemistry results called to and read back by antonio zamudio rn, 05/10/2021  06:46, by Clarice Villegas  Performed at:  [facility] [address], [place], [facility] [employer]   Phone [phone]  - Abnormal; Notable for the following components:    Pro-BNP 29,926 (*)     All other components within normal limits    Narrative:     CALL  [facility] tel. [phone],  Chemistry results called to and read back by antonio zamudio rn, 05/10/2021  06:46, by Clarice Villegas  Performed at:  [facility] [address], [place], [facility] [employer]   Phone [phone] BLOOD GAS, VENOUS - Abnormal; Notable for the following components:    pH, Kieran 7.195 (*)     pCO2, Kieran 63.6 (*)     pO2, Kieran 143.0 (*)     Base Excess, Kieran -4.5 (*)     All other components within normal limits    Narrative:     [facility] tel. [phone],  Chemistry results called to and read back by antonio zamudio rn, 05/10/2021  06:50, by [facility]  Chemistry results called to and read back by antonio zamudio rn, 05/10/2021  06:46, by [facility]  Performed at:  [facility] [address], [place], [facility] [employer]   Phone [phone]   PROCALCITONIN - Abnormal; Notable for the following components:    Procalcitonin 0.36 (*)     All other components within normal limits    Narrative:     Dessie Kehr  [facility] tel. [phone],  Chemistry results called to and read back by antonio zamudio rn, 05/10/2021  06:46, by [facility]  Performed at:  [facility] [address], [place], [address] [employer]   Phone [phone]   BLOOD GAS, ARTERIAL - Abnormal; Notable for the following components:    pH, Arterial 7.207 (*)     pCO2, Arterial 66.7 (*)     pO2, Arterial 138.0 (*)     All other components within normal limits    Narrative:     Performed at:  [facility] [address], [place], [address] [employer]   Phone [phone]   PROTIME-INR - Abnormal; Notable for the following components:    Protime 16.1 (*)     INR 1.38 (*)     All other components within normal limits    Narrative:     Performed at:  [facility] [address], [place], [employer]   Phone [phone], RAPID    Narrative:     Performed at:  [facility] [address], [place], [facility] [employer]   Phone [phone]   LACTATE, SEPSIS    Narrative:     Performed at:  [facility] [address], [place], [facility] [employer]   Phone [phone]   URINE RT REFLEX TO CULTURE HEPATITIS B SURFACE ANTIGEN         The Ekg interpreted by me in the absence of a cardiologist shows. normal sinus rhythm with a rate of 100  Axis is   Normal  QTc is  normal  Intervals and Durations are unremarkable. No specific ST-T wave changes appreciated. No evidence of acute ischemia. No priors for comparison     Imaging  XR CHEST PORTABLE   Final Result   Findings consistent with mild congestive failure. MDM and ED Course  Patient is 59-year-old female with history of CABG and ESRD presenting to the emergency room for 1 week of chest pain and acute onset shortness of breath. On my exam after she has been placed on BiPAP she has no increased work of breathing and is well-appearing. Vital signs are stable. She does appear clinically overloaded. There is fluid noted on chest x-ray. She does have mild troponin elevation today possibly in the setting of her ESRD. ABG is consistent with a respiratory acidosis. Her initial EKG is not suggestive of acute cardiac ischemia. I did discuss with nephrology Dr. Sam Oden who will arrange for dialysis this morning. Patient to be admitted for dialysis, further BiPAP therapy. She and daughter are agreeable to plan. Final Impression  1. Dyspnea and respiratory abnormalities    2. COPD exacerbation ([address]    3. Hypoxia    4. Respiratory acidosis        Blood pressure 132/63, pulse 80, temperature 97.6 °F (36.4 °C), temperature source Oral, resp. rate 13, height 5' (1.524 m), weight 182 lb (82.6 kg), SpO2 100 %. Disposition:  DISPOSITION        Patient Referrals:  No follow-up provider specified. Discharge Medications:  New Prescriptions    No medications on file         [employer]    This chart was generated using the [address] [place] [address] ViViFiation system. I created this record but it may contain dictation errors given the limitations of this technology.        Galilea Wilson MD  05/10/21 9423

## 2021-05-10 NOTE — ED NOTES
Bed: 02  Expected date:   Expected time:   Means of arrival:   Comments:  [facility], RN  05/10/21 9952

## 2021-05-10 NOTE — CONSULTS
Clinical Pharmacy Note: Pharmacy to Dose Warfarin    Pharmacy consulted by Dr. Brenda Dorado to dose warfarin. Mikel Jarrell is a 76 y.o. female  is receiving warfarin for indication: PEs. INR Goal Range: 2-3  Prior to admission warfarin dosing regimen: 3.75 mg Mon/Thurs/Sat; 2.5 mg all other days of the week  INR today:   Lab Results   Component Value Date    INR 1.38 05/10/2021       Assessment/Plan:  INR is subtherapeutic on prior to admission dosing regimen. Will dose warfarin at 5 mg x1 tonight and then increase home dose to 3.75 mg daily while here. Per outpatient [facility] notes, concern for possible non-compliance with home regimen. Daily INR is ordered. Pharmacy will continue to monitor and make adjustments to regimen as necessary.      Thank you for the consult,     Damian Goodell, PharmD, [address]   [id number]

## 2021-05-10 NOTE — PROGRESS NOTES
Voice: Adequate for stated age     Oral Phase:   Prolonged mastication (baseline due to dentition)   Apparent premature bolus loss to pharynx    Pharyngeal Phase:  Apparent pharyngeal pooling  Delayed swallow initiation  Coughing (immediate) with thins via straw provided in combination with other textures     Patient/Family Education:Education, results and recommendations given to the Pt and nurse, who verbalized understanding    Timed Code Treatment: 0     Total Treatment Time: 23 minutes     Discharge Recommendations: Speech Therapy for Speech/Dysphagia treatment at discharge. If patient discharges prior to next session this note will serve as a discharge summary. Signature:   Shefali ZAMBRANO  CCC-SLP #[id number] 5/10/2021 10:53 AM  Speech-Language Pathologist

## 2021-05-10 NOTE — CONSULTS
verbal commands     History Obtained From:  patient + treatment team + Electronic Medical Records. HPI: Ms. Toña Luciano is a 76 y.o. female with significant past medical history of End stage renal disease, and   Past Medical History:   Diagnosis Date    CAD (coronary artery disease)     ESRD (end stage renal disease) on dialysis ([facility] [address]     Gout     HYPERCHOLESTERAEMIA     Hypertension     Hypothyroidism     ,   presents with Shortness of Breath (Pt. arrived to the ED via [facility]. Pt. was headed to dialysis this am and pt. states daughter called 911 because I couldn't breathe. Pt. arrived via non rebreather o2-95% pt. diaphoretic. Pt. able to follow verbal commands)  Admitted with Acute respiratory failure with hypoxia and hypercapnia ([address] [J96.01, J96.02]  Needing NIPPV in ER. We are called for ESRD care. Re: ESRD  · Duration (when): Chronic   · Location (where): kidneys  · Severity (ex: CKD stage): End stage  · Timing (ex: continuous, intermittent): intermittent HD  · Context (ex: related to condition): presumed DM / HTN related.   · Modifying factors (ex: medications, interventions): dialysis support  · Associated signs & symptoms (ex: edema, SOB): Refer to HPI and Chief complaint    Past medical, surgical, social and family medial history reviewed by me:   PAST MEDICAL HISTORY:   Past Medical History:   Diagnosis Date    CAD (coronary artery disease)     ESRD (end stage renal disease) on dialysis ([facility] [place] [address]     Gout     HYPERCHOLESTERAEMIA     Hypertension     Hypothyroidism      PAST SURGICAL HISTORY:   Past Surgical History:   Procedure Laterality Date    IR FEMORAL POPLITEAL BYPASS GRAFT       FAMILY HISTORY:   Family History   Problem Relation Age of Onset    Stroke Mother      SOCIAL HISTORY:   Social History     Socioeconomic History    Marital status: Single     Spouse name: None    Number of children: None    Years of education: None    Highest education level: None Occupational History    None   Social Needs    Financial resource strain: None    Food insecurity     Worry: None     Inability: None    Transportation needs     Medical: None     Non-medical: None   Tobacco Use    Smoking status: Current Every Day Smoker     Packs/day: 1.00     Types: Cigarettes   Substance and Sexual Activity    Alcohol use: Yes     Comment: occassioanal    Drug use: None    Sexual activity: None   Lifestyle    Physical activity     Days per week: None     Minutes per session: None    Stress: None   Relationships    Social connections     Talks on phone: None     Gets together: None     Attends Zoroastrian service: None     Active member of club or organization: None     Attends meetings of clubs or organizations: None     Relationship status: None    Intimate partner violence     Fear of current or ex partner: None     Emotionally abused: None     Physically abused: None     Forced sexual activity: None   Other Topics Concern    None   Social History Narrative    None         MEDICATIONS reviewed by me:  Prior to Admission Medications:  No current facility-administered medications on file prior to encounter. No current outpatient medications on file prior to encounter. No current facility-administered medications on file prior to encounter. No current outpatient medications on file prior to encounter.        Current Facility-Administered Medications:     sodium chloride flush 0.9 % injection 5-40 mL, 5-40 mL, Intravenous, 2 times per day, Griselda Wyatt MD    sodium chloride flush 0.9 % injection 5-40 mL, 5-40 mL, Intravenous, PRN, Griselda Wyatt MD    0.9 % sodium chloride infusion, 25 mL, Intravenous, PRN, Griselda Wyatt MD    heparin (porcine) injection 5,000 Units, 5,000 Units, Subcutaneous, 3 times per day, Griselda Wyatt MD    promethazine (PHENERGAN) tablet 12.5 mg, 12.5 mg, Oral, Q6H PRN **OR** ondansetron (ZOFRAN) injection 4 mg, 4 mg, Intravenous, Q6H PRN, Louie Cheryl Mendez MD    polyethylene glycol [facility]) packet 17 g, 17 g, Oral, Daily PRN, Sarah Mcneill MD    acetaminophen (TYLENOL) tablet 650 mg, 650 mg, Oral, Q6H PRN **OR** acetaminophen (TYLENOL) suppository 650 mg, 650 mg, Rectal, Q6H PRN, Sarah Mcneill MD    glucose (GLUTOSE) 40 % oral gel 15 g, 15 g, Oral, PRN, Sarah Mcneill MD    dextrose 50 % IV solution, 12.5 g, Intravenous, PRN, Sarah Mcneill MD    glucagon (rDNA) injection 1 mg, 1 mg, Intramuscular, PRN, Sarah Mcneill MD    dextrose 5 % solution, 100 mL/hr, Intravenous, PRN, Sarah Mcneill MD    insulin lispro (1 Unit Dial) 0-12 Units, 0-12 Units, Subcutaneous, TID WC, Sarah Mcneill MD    insulin lispro (1 Unit Dial) 0-6 Units, 0-6 Units, Subcutaneous, Nightly, Sarah Mcneill MD    aspirin chewable tablet 81 mg, 81 mg, Oral, Daily, Sarah Mcneill MD    budesonide-formoterol (SYMBICORT) 160-4.5 MCG/ACT inhaler 2 puff, 2 puff, Inhalation, BID, Sarah Mcneill MD, Stopped at 05/10/21 1211    ipratropium (ATROVENT HFA) 17 MCG/ACT inhaler 2 puff, 2 puff, Inhalation, 4x daily, Sarah Mcneill MD, Stopped at 05/10/21 1150    perflutren lipid microspheres (DEFINITY) injection 1.65 mg, 1.5 mL, Intravenous, ONCE PRN, Sarah Mcneill MD    albuterol sulfate  (90 Base) MCG/ACT inhaler 2 puff, 2 puff, Inhalation, 4x daily, Sarah Mcneill MD    No medications prior to admission. Inpatient Medications:  Scheduled Meds:  Continuous Infusions:  PRN Meds:. Allergies: Patient has no known allergies. REVIEW OF SYSTEMS:  As mentioned in HPI and CC  All other 10-point review of systems: negative.         PHYSICAL EXAM:  Patient Vitals for the past 24 hrs:   BP Temp Temp src Pulse Resp SpO2 Height Weight   05/10/21 0630 132/88 -- -- 88 22 100 % -- --   05/10/21 0619 -- -- -- -- 20 100 % -- --   05/10/21 0611 -- -- -- -- 23 -- -- --   05/10/21 0600 (!) 176/88 -- -- 105 25 100 % 5' (1.524 m) 182 lb (82.6 kg)   05/10/21 0549 -- 97.6 °F (36.4 °C) Oral 112 25 94 % -- --     No intake

## 2021-05-10 NOTE — CONSULTS
[address]  Cardiac Consult     Referring Provider:  Liberty Bucio         History of Present Illness:  76 y/ female with h/o CAD s/p CABG and ESRD on HD admitted for dyspnea and we were asked to see her for elevated troponin. She developed severe dyspnea yesterday and came to the ER instead of dialysis. This seems to have come on fairly quickly without exacerbating or relieving factors. No associated chest pain. She was originally hypoxic and placed on bipap with improvement. She is now back from dialysis and feels muck better. She does have a h/o atypical left sided chest pain since CABG 2/2020. She had a stress myoview due to this 7/2020 that was non-ischemic. Past Medical History:   has a past medical history of CAD (coronary artery disease), ESRD (end stage renal disease) on dialysis ([address] Gout, HYPERCHOLESTERAEMIA, Hypertension, and Hypothyroidism. Surgical History:   has a past surgical history that includes IR Femoral Popliteal Bypass Graft. Social History:   reports that she has been smoking cigarettes. She has been smoking about 1.00 pack per day. She does not have any smokeless tobacco history on file. She reports current alcohol use. Family History:  family history includes Stroke in her mother. Medications:   sodium chloride flush  5-40 mL Intravenous 2 times per day    heparin (porcine)  5,000 Units Subcutaneous 3 times per day    insulin lispro  0-12 Units Subcutaneous TID WC    insulin lispro  0-6 Units Subcutaneous Nightly    aspirin  81 mg Oral Daily    budesonide-formoterol  2 puff Inhalation BID    ipratropium  2 puff Inhalation 4x daily    albuterol sulfate HFA  2 puff Inhalation 4x daily    warfarin  5 mg Oral Once    [START ON 5/11/2021] warfarin  3.75 mg Oral Daily         Allergies:  Patient has no known allergies. ? Medications and dosages reviewed.     ROS:  ?Full ROS obtained and negative except as mentioned in HPI      Physical Examination: Vitals:    05/10/21 1440   BP: (!) 162/87   Pulse: 110   Resp: 20   Temp: 96.7 °F (35.9 °C)   SpO2:         · GENERAL: Well developed, well nourished, No acute distress  · NEUROLOGICAL: Alert and oriented  · PSYCH: Calm affect  · SKIN: Warm and dry, No visible rash,   · EYES: Pupils equal and round, Sclera non-icteric,   · HENT:  External ears and nose unremarkable, mucus membranes moist  · MUSCULOSKELETAL: Normal cephalic, neck supple  · CAROTID: Normal upstroke, no bruits  · CARDIAC: JVP normal, Normal PMI, regular rate and rhythm, normal S1S2, no murmur, rub, or gallop  · RESPIRATORY: Normal respiratory effort, clear to auscultation bilaterally  · EXTREMITIES: No edema  · GASTROINTESTINAL: normal bowel sounds, soft, non-tender, No hepatomegaly     All testing and labs listed below were personally reviewed. CXR-Images reviewed  Heart size is upper limits of normal and there is pulmonary vascular   congestion. There may be mild bibasilar interstitial edema. No pleural   effusion or pneumothorax. No consolidating infiltrate. Minimal fluid in the   right minor fissure. Surrounding osseous and soft tissue structures are   noted for prior median sternotomy for CABG. Impression:  Findings consistent with mild congestive failure. LABS  WBC9.9K/uL RBC4. 19M/uL Euenlakpad65.7Low g/dL Bnrackfdub04.4% MCV86. 9fL MCH27.9pg MCHC32.1g/dL RDW19. 4High % Lymohnmyr404P/uL   SARS-CoV-2, NAATNot Detected   Pro-BNP29,926High pg/mL   Calcium9.4mg/dL Total Protein7.6g/dL Albumin4.2g/dL Albumin/Globulin Ratio1. 2 Total Bilirubin0.4mg/dL Alkaline Ggorvnyzmqm55F/L ALT21U/L AST35U/L   Owcpsa987adqn/L Potassium reflex Magnesium5. 4High mmol/L   Bblyppye093thib/L JE763ebyr/L Anion Gap14 Kiueyxs710Gsmg mg/dL UZR01Tksc mg/dL CREATININE 9.7High Panic mg/dL   Procalcitonin0. 36High   pH, Arterial7.207Low pCO2, Ocznqjhh08. 7High mmHg pO2, Eafypzjh584. 0High mmHg HCO3, Vhhumkeq39.5mmol/L   Results for Rosalita Glory (MRN [id number]) as of 5/10/2021 15:15   Ref. Range 5/10/2021 06:02 5/10/2021 09:49   Troponin Latest Ref Range: <0.01 ng/mL 0.06 (H) 0.13 (H)     EKG: (tracings reviewed)  NSR, Normal      MYOVIEW 7/2020   FINAL INTERPRETATION  There is normal myocardial perfusion. The patient has low left ventricular volumes. Overall study quality is excellent.  Scan significance indicates low cardiac risk. ECHO  5/2020  Study Conclusions    - Left ventricle: The cavity size is normal. Wall thickness was increased in a pattern of moderate    LVH. Systolic function was normal. The estimated ejection fraction was in the range of 60% to 65%.    Wall motion was normal; there were no regional wall motion abnormalities. Doppler parameters are    consistent with abnormal left ventricular relaxation (grade 1 diastolic dysfunction). - Aortic valve: Trileaflet; calcified, thickened leaflets. There is a focal thickening and    calcification of the non-coronary cusp. - Left atrium: The atrium is mildly dilated. - Right ventricle: The cavity size is normal. Systolic function was normal by objective    interpretation. TAPSE: 1.8cm.  - Pulmonary arteries: Systolic pressure could not be accurately estimated. - Inferior vena cava: The vessel was normal in size. - Pericardium, extracardiac: A small pericardial effusion is identified      Assessment:     Respiratory Failure:  Seems to be CHF likely from fluid overload. Resolved after dialysis. Repeat ECHO    Elevated troponin:  Likely due to CHF in setting of renal failure. With acute CHF without missing dialysis and atypical chest pain, would recommend repeat stress prior to d/c    ESRD:  On HD. Creat seems high and looking at records appears to have increase 1/2021.   I am unsure why and defer to renal    1/23/2021 1/22/2021 1/21/2021 7/6/2020 7/5/2020 7/4/2020 7/3/2020     creat  11.88 (H) 11.83 (H) 11.54 (H) 5.10 (H) 4.22 (H) 3.04 (H) 1.88 (H)         Plan:  Dialysis as needed  ECHO  Myoview    Thank you for allowing me to participate in the care of this individual.      Phil Macias M.D., [address] [facility]

## 2021-05-10 NOTE — ED NOTES
Report given to ICU RN. Pt alert and oriented and shows no signs of distress at time of report.         Diana Jorgensen RN  05/10/21 9296

## 2021-05-10 NOTE — FLOWSHEET NOTE
05/10/21 1140 05/10/21 1440   Vital Signs   BP (!) 163/82 (!) 162/87   Temp 96.7 °F (35.9 °C) 96.7 °F (35.9 °C)   Pulse 98 110   Resp 24 20     Treatment time: 3 hours    Net UF: 1.9 L    Pre weight: 60.5 kg (bed scale)  Post weight: 58.6 kg (bed scale)  EDW: 63 kg    Access used: LUIS ALBERTO AVF  Access function: No problems    Medications or blood products given: None    Regular outpatient schedule: [address] MWF    Summary of response to treatment: Arrived to AR SOB, O2 4L/min/NC on, resp became less labored throughout tx. C/o leg cramps last hour of tx, UFG decreased from 3 L to 2.3 L, minimum UF. On bedpan x2 with no BM. Copy of dialysis treatment record placed in chart, to be scanned into EMR.     Report called to Mayte Alberto RN

## 2021-05-10 NOTE — ED PROVIDER NOTES
Primary Care Physician: Rodrigo Aldrich   Attending Physician: Shira Louis MD     History   Chief Complaint   Patient presents with    Shortness of Breath     Pt. arrived to the ED via [facility]. Pt. was headed to dialysis this am and pt. states daughter called 911 because I couldn't breathe. Pt. arrived via non rebreather o2-95% pt. diaphoretic. Pt. able to follow verbal commands        HPI   Ute Farley is a 76 y.o. female history of coronary artery disease status post CABG, diabetes on hemodialysis Monday Wednesdays and Fridays, hypertension, hyperlipidemia presenting this morning brought by EMS complaining of shortness of breath. Patient states that she got up this morning at 4 AM and developed severe shortness of breath. She was satting 80% on room air and on arrival patient was on 10 L nonrebreather. She was very diaphoretic and talking in short sentences. .    Past Medical History:   Diagnosis Date    Gout     HYPERCHOLESTERAEMIA     HYPERTENSION     Hypothyroidism         Past Surgical History:   Procedure Laterality Date    FEMORAL-POPLITEAL BYPASS GRAFT          No family history on file.      Social History     Socioeconomic History    Marital status: Single     Spouse name: Not on file    Number of children: Not on file    Years of education: Not on file    Highest education level: Not on file   Occupational History    Not on file   Social Needs    Financial resource strain: Not on file    Food insecurity     Worry: Not on file     Inability: Not on file    Transportation needs     Medical: Not on file     Non-medical: Not on file   Tobacco Use    Smoking status: Current Every Day Smoker     Packs/day: 1.00     Types: Cigarettes   Substance and Sexual Activity    Alcohol use: Yes     Comment: occassioanal    Drug use: Not on file    Sexual activity: Not on file   Lifestyle    Physical activity     Days per week: Not on file     Minutes per session: Not on file    Stress: Not on file   Relationships    Social connections     Talks on phone: Not on file     Gets together: Not on file     Attends Hindu service: Not on file     Active member of club or organization: Not on file     Attends meetings of clubs or organizations: Not on file     Relationship status: Not on file    Intimate partner violence     Fear of current or ex partner: Not on file     Emotionally abused: Not on file     Physically abused: Not on file     Forced sexual activity: Not on file   Other Topics Concern    Not on file   Social History Narrative    Not on file        Review of Systems   10 total systems reviewed and found to be negative unless otherwise noted in HPI     Physical Exam   Pulse 112   Temp 97.6 °F (36.4 °C) (Oral)   Resp 25   SpO2 94%      CONSTITUTIONAL: ill appearing, in no acute distress   HEAD: atraumatic, normocephalic   EYES: PERRL, No injection, discharge or scleral icterus. ENT: Moist mucous membranes. NECK: Normal ROM, NO LAD   CARDIOVASCULAR: Regular rate and rhythm. No murmurs or gallop. PULMONARY/CHEST: Airway patent. No retractions. Breath sounds clear with good air entry bilaterally. ABDOMEN: Soft, Non-distended and non-tender, without guarding or rebound. SKIN: Acyanotic, warm, dry   MUSCULOSKELETAL: No swelling, tenderness or deformity   NEUROLOGICAL: Awake and oriented x 3. Pulses intact. Grossly nonfocal   Nursing note and vitals reviewed.      ED Course & Medical Decision Making   Medications   ipratropium-albuterol (DUONEB) nebulizer solution 3 ampule (has no administration in time range)   magnesium sulfate 2000 mg in 50 mL IVPB premix (2,000 mg Intravenous New Bag 5/10/21 0267)   dexamethasone (PF) (DECADRON) injection 10 mg (10 mg Intravenous Given 5/10/21 7293)      Labs Reviewed   COVID-19, RAPID   CBC WITH AUTO DIFFERENTIAL   COMPREHENSIVE METABOLIC PANEL W/ REFLEX TO MG FOR LOW K   TROPONIN   BRAIN NATRIURETIC PEPTIDE   LACTATE, SEPSIS   LACTATE, SEPSIS   URINE RT REFLEX TO CULTURE   BLOOD GAS, VENOUS   PROCALCITONIN   BLOOD GAS, ARTERIAL      XR CHEST PORTABLE    (Results Pending)        EKG INTERPRETATION:  Pending    PROCEDURES:   Procedures    ASSESSMENT AND PLAN:  Angy Fam is a 76 y.o. female history of COPD, coronary artery disease status post CABG, end-stage renal disease on hemodialysis Monday Wednesdays Fridays presenting complaint of shortness of breath. On exam patient appears ill, diaphoretic respiratory distress requiring significant amount of oxygen. There was some mild wheezing. On arrival she was switched from a nonrebreather to a BiPAP with some improvement. Labs are pending including x-rays and EKG. her care has been signed out to the morning attending will follow up with patient accordingly. ClINICAL IMPRESSION:  1. Dyspnea and respiratory abnormalities    2. COPD exacerbation ([address]    3. Hypoxia        DISPOSITION    Pending complete work-up  Yolis Zavala MD (electronically signed)  5/10/2021  _________________________________________________________________________________________  _________________________________________________________________________________________  This record is transcribed utilizing voice recognition technology. There are inherent limitations in this technology. In addition, there may be limitations in editing of this report. If there are any discrepancies, please contact me directly.         Kathryn Schwarz MD  05/10/21 2686

## 2021-05-11 LAB
ANION GAP SERPL CALCULATED.3IONS-SCNC: 15 MMOL/L (ref 3–16)
BASOPHILS ABSOLUTE: 0 K/UL (ref 0–0.2)
BASOPHILS RELATIVE PERCENT: 0.3 %
BUN BLDV-MCNC: 47 MG/DL (ref 7–20)
CALCIUM SERPL-MCNC: 8.9 MG/DL (ref 8.3–10.6)
CHLORIDE BLD-SCNC: 97 MMOL/L (ref 99–110)
CO2: 25 MMOL/L (ref 21–32)
CREAT SERPL-MCNC: 6.7 MG/DL (ref 0.6–1.2)
EOSINOPHILS ABSOLUTE: 0 K/UL (ref 0–0.6)
EOSINOPHILS RELATIVE PERCENT: 0 %
GFR AFRICAN AMERICAN: 7
GFR NON-AFRICAN AMERICAN: 6
GLUCOSE BLD-MCNC: 109 MG/DL (ref 70–99)
GLUCOSE BLD-MCNC: 114 MG/DL (ref 70–99)
GLUCOSE BLD-MCNC: 81 MG/DL (ref 70–99)
GLUCOSE BLD-MCNC: 93 MG/DL (ref 70–99)
GLUCOSE BLD-MCNC: 97 MG/DL (ref 70–99)
HCT VFR BLD CALC: 36.9 % (ref 36–48)
HEMOGLOBIN: 11.9 G/DL (ref 12–16)
INR BLD: 1.72 (ref 0.86–1.14)
LV EF: 50 %
LVEF MODALITY: NORMAL
LYMPHOCYTES ABSOLUTE: 1 K/UL (ref 1–5.1)
LYMPHOCYTES RELATIVE PERCENT: 15.7 %
MCH RBC QN AUTO: 27.7 PG (ref 26–34)
MCHC RBC AUTO-ENTMCNC: 32.2 G/DL (ref 31–36)
MCV RBC AUTO: 86.1 FL (ref 80–100)
MONOCYTES ABSOLUTE: 0.6 K/UL (ref 0–1.3)
MONOCYTES RELATIVE PERCENT: 9.3 %
NEUTROPHILS ABSOLUTE: 4.5 K/UL (ref 1.7–7.7)
NEUTROPHILS RELATIVE PERCENT: 74.7 %
PDW BLD-RTO: 19.4 % (ref 12.4–15.4)
PERFORMED ON: ABNORMAL
PERFORMED ON: NORMAL
PLATELET # BLD: 128 K/UL (ref 135–450)
PMV BLD AUTO: 8.2 FL (ref 5–10.5)
POTASSIUM REFLEX MAGNESIUM: 4.7 MMOL/L (ref 3.5–5.1)
PROTHROMBIN TIME: 20.1 SEC (ref 10–13.2)
RBC # BLD: 4.29 M/UL (ref 4–5.2)
SODIUM BLD-SCNC: 137 MMOL/L (ref 136–145)
WBC # BLD: 6.1 K/UL (ref 4–11)

## 2021-05-11 PROCEDURE — 6370000000 HC RX 637 (ALT 250 FOR IP): Performed by: INTERNAL MEDICINE

## 2021-05-11 PROCEDURE — 2580000003 HC RX 258: Performed by: INTERNAL MEDICINE

## 2021-05-11 PROCEDURE — 80048 BASIC METABOLIC PNL TOTAL CA: CPT

## 2021-05-11 PROCEDURE — 85025 COMPLETE CBC W/AUTO DIFF WBC: CPT

## 2021-05-11 PROCEDURE — 94640 AIRWAY INHALATION TREATMENT: CPT

## 2021-05-11 PROCEDURE — 6360000002 HC RX W HCPCS: Performed by: INTERNAL MEDICINE

## 2021-05-11 PROCEDURE — 99232 SBSQ HOSP IP/OBS MODERATE 35: CPT | Performed by: INTERNAL MEDICINE

## 2021-05-11 PROCEDURE — 97161 PT EVAL LOW COMPLEX 20 MIN: CPT

## 2021-05-11 PROCEDURE — 94660 CPAP INITIATION&MGMT: CPT

## 2021-05-11 PROCEDURE — A9502 TC99M TETROFOSMIN: HCPCS | Performed by: INTERNAL MEDICINE

## 2021-05-11 PROCEDURE — 2000000000 HC ICU R&B

## 2021-05-11 PROCEDURE — 93306 TTE W/DOPPLER COMPLETE: CPT

## 2021-05-11 PROCEDURE — 94761 N-INVAS EAR/PLS OXIMETRY MLT: CPT

## 2021-05-11 PROCEDURE — 97530 THERAPEUTIC ACTIVITIES: CPT

## 2021-05-11 PROCEDURE — 97165 OT EVAL LOW COMPLEX 30 MIN: CPT

## 2021-05-11 PROCEDURE — 85610 PROTHROMBIN TIME: CPT

## 2021-05-11 PROCEDURE — 78451 HT MUSCLE IMAGE SPECT SING: CPT

## 2021-05-11 PROCEDURE — 3430000000 HC RX DIAGNOSTIC RADIOPHARMACEUTICAL: Performed by: INTERNAL MEDICINE

## 2021-05-11 RX ORDER — IPRATROPIUM BROMIDE AND ALBUTEROL SULFATE 2.5; .5 MG/3ML; MG/3ML
1 SOLUTION RESPIRATORY (INHALATION)
Status: DISCONTINUED | OUTPATIENT
Start: 2021-05-11 | End: 2021-05-14 | Stop reason: HOSPADM

## 2021-05-11 RX ADMIN — REGADENOSON 0.4 MG: 0.08 INJECTION, SOLUTION INTRAVENOUS at 11:30

## 2021-05-11 RX ADMIN — Medication 2 PUFF: at 09:21

## 2021-05-11 RX ADMIN — TETROFOSMIN 10 MILLICURIE: 1.38 INJECTION, POWDER, LYOPHILIZED, FOR SOLUTION INTRAVENOUS at 10:22

## 2021-05-11 RX ADMIN — Medication 10 ML: at 09:09

## 2021-05-11 RX ADMIN — IPRATROPIUM BROMIDE AND ALBUTEROL SULFATE 1 AMPULE: .5; 3 SOLUTION RESPIRATORY (INHALATION) at 09:21

## 2021-05-11 RX ADMIN — IPRATROPIUM BROMIDE AND ALBUTEROL SULFATE 1 AMPULE: .5; 3 SOLUTION RESPIRATORY (INHALATION) at 19:38

## 2021-05-11 RX ADMIN — IPRATROPIUM BROMIDE AND ALBUTEROL SULFATE 1 AMPULE: .5; 3 SOLUTION RESPIRATORY (INHALATION) at 17:13

## 2021-05-11 RX ADMIN — ASPIRIN 81 MG: 81 TABLET, CHEWABLE ORAL at 09:09

## 2021-05-11 RX ADMIN — HEPARIN SODIUM 5000 UNITS: 5000 INJECTION INTRAVENOUS; SUBCUTANEOUS at 20:35

## 2021-05-11 RX ADMIN — HEPARIN SODIUM 5000 UNITS: 5000 INJECTION INTRAVENOUS; SUBCUTANEOUS at 05:27

## 2021-05-11 RX ADMIN — Medication 2 PUFF: at 19:40

## 2021-05-11 RX ADMIN — Medication 10 ML: at 20:35

## 2021-05-11 RX ADMIN — ACETAMINOPHEN 650 MG: 325 TABLET ORAL at 05:23

## 2021-05-11 NOTE — PROGRESS NOTES
MD Jessica Latham MD Veneta Crape, MD                Office: [phone]                      Fax: [phone] INPATIENT PROGRESS NOTE:     PATIENT NAME: Brittney Brice  :   MRN: [id number]      IMPRESSION / RECOMMENDATION:    Admitted for:  Acute respiratory failure with hypoxia and hypercapnia ([address] [J96.01, J96.02] - needing BIPAP to NC now   - f/u ECHO , CXR: Findings consistent with mild congestive failure. - Empirical abx  - f/u after more UF on HD       1. ESRD on iHD: MWF.   - outpt HD center: [facility] nephrology) - as per patient   - Access: Left UAAV: (+) T/B  -  S/P HD Monday for solute and volume control   Pre weight: 60.5 kg (bed scale)  Post weight: 58.6 kg (bed scale) - might be her new DW   EDW: 63 kg  - next on Wednesday  - renally stable     D/W pt, nurse, hospitalist       2. Hypertension/Volume Status:  - BP HTN - no need for tight control f/u after HD:   - EDW - 63 kg - 60 kg on admission     3. Electrolytes/acid-base:  K: Hyperkalemia - f/u after HD w/ lower K bath   High AG metabolic acidosis: controlled     4. Anemia of renal failure: at goal  - LIBERTAD: not needed     5. BMD:  - Phos: f/u in am labs   - follow iPTH outpt    : Other supportive care :   - Check daily renal function panel with electrolytes-phosphorus  - Strict monitoring of I/Os, daily weight  - Renal feeds/diet  - Current medications reviewed. - Nephrotoxic medications have been discontinued. - Dose adjusted and appropriate. - Dose meds for eGFR <15 mL/min/1.73m2.    - Avoid heavy opioids due to renal failure - may use very low dose dilaudid / fentanyl with close monitoring of CNS and respiratory depression.          Other Problems:  Hospital Problems           Last Modified POA    * (Principal) Acute respiratory failure with hypoxia and hypercapnia ([address] 5/10/2021 Yes    COPD with acute exacerbation ([facility] 5/10/2021 Yes    Volume overload 5/10/2021 Yes    ESRD on dialysis ([address] 5/10/2021 Yes    CAD (coronary artery disease) 5/10/2021 Yes    PVD (peripheral vascular disease) ([address] 5/10/2021 Yes    Renal artery stenosis ([address] 5/10/2021 Yes    Obesity (BMI 30-39. 9) 5/10/2021 Yes    DVT (deep venous thrombosis) ([address] 5/10/2021 Yes    Pulmonary embolism ([address] 5/10/2021 Yes    Hyperkalemia 5/10/2021 Yes    Elevated troponin 5/10/2021 Yes    DM2 (diabetes mellitus, type 2) ([facility] 5/10/2021 Yes    Aspiration pneumonitis ([address] 5/10/2021 Yes    Dysphagia 5/10/2021 Yes          Please refer to orders. Multiple complex problems. High risk  Discussed with patient, and treatment team  - hospitalist   Thank you for allowing me to participate in this patient's care. Please do not hesitate to contact me with any questions/concerns. We will follow along with you. Fátima Gonzales MD       [facility] [address] [place]: [phone] or Via PhoneTell  Fax: [phone]      ===========================================  ===========================================  · Subjective:  -seen resting in chair   Reported feeling better  After HD   Not on BIPAP anymore, on NC     Past medical, surgical, social and family medial history reviewed by me:     MEDICATIONS reviewed by me:  Prior to Admission Medications:  No current facility-administered medications on file prior to encounter. No current outpatient medications on file prior to encounter. No current facility-administered medications on file prior to encounter. No current outpatient medications on file prior to encounter.        Current Facility-Administered Medications:     ipratropium-albuterol (DUONEB) nebulizer solution 1 ampule, 1 ampule, Inhalation, Q4H WA, Fátima Gonzales MD    sodium chloride flush 0.9 % injection 5-40 mL, 5-40 mL, Intravenous, 2 times per day, Niki Madison MD, 10 mL at 05/10/21 0589    sodium chloride flush 0.9 % injection 5-40 mL, 5-40 mL, Intravenous, PRN, Kayleigh Ruiz MD    0.9 % sodium chloride infusion, 25 mL, Intravenous, PRN, Kayleigh Ruiz MD    heparin (porcine) injection 5,000 Units, 5,000 Units, Subcutaneous, 3 times per day, Kayleigh Ruiz MD, 5,000 Units at 05/11/21 0527    promethazine (PHENERGAN) tablet 12.5 mg, 12.5 mg, Oral, Q6H PRN **OR** ondansetron (ZOFRAN) injection 4 mg, 4 mg, Intravenous, Q6H PRN, Kayleigh Ruiz MD    polyethylene glycol (GLYCOLAX) packet 17 g, 17 g, Oral, Daily PRN, Kayleigh Ruiz MD    acetaminophen (TYLENOL) tablet 650 mg, 650 mg, Oral, Q6H PRN, 650 mg at 05/11/21 0523 **OR** acetaminophen (TYLENOL) suppository 650 mg, 650 mg, Rectal, Q6H PRN, Kayleigh Ruiz MD    glucose (GLUTOSE) 40 % oral gel 15 g, 15 g, Oral, PRN, Kayleigh Ruiz MD    dextrose 50 % IV solution, 12.5 g, Intravenous, PRN, Kayleigh Ruiz MD    glucagon (rDNA) injection 1 mg, 1 mg, Intramuscular, PRN, Kayleigh Ruiz MD    dextrose 5 % solution, 100 mL/hr, Intravenous, PRN, Kayleigh Ruiz MD    insulin lispro (1 Unit Dial) 0-12 Units, 0-12 Units, Subcutaneous, TID WC, Kayleigh Ruiz MD, Stopped at 05/11/21 0747    insulin lispro (1 Unit Dial) 0-6 Units, 0-6 Units, Subcutaneous, Nightly, Kayleigh Ruiz MD, 1 Units at 05/10/21 2112    aspirin chewable tablet 81 mg, 81 mg, Oral, Daily, Kayleigh Ruiz MD    budesonide-formoterol (SYMBICORT) 160-4.5 MCG/ACT inhaler 2 puff, 2 puff, Inhalation, BID, Kayleigh Ruiz MD, 2 puff at 05/10/21 1938    perflutren lipid microspheres (DEFINITY) injection 1.65 mg, 1.5 mL, Intravenous, ONCE PRN, Kayleigh Ruiz MD    warfarin (COUMADIN) tablet 3.75 mg, 3.75 mg, Oral, Daily, Kayleigh Ruiz MD    regadenoson (LEXISCAN) injection 0.4 mg, 0.4 mg, Intravenous, ONCE PRN, Alyssa Norman MD    No medications prior to admission. Inpatient Medications:  Scheduled Meds:  Continuous Infusions:  PRN Meds:.   REVIEW OF SYSTEMS:  As mentioned in HPI and CC    PHYSICAL EXAM:  Patient Vitals for the past 24 hrs:   BP Temp Temp src Pulse Resp SpO2 Height Weight   05/11/21 0601 -- -- -- -- -- -- 5' (1.524 m) 133 lb 6.1 oz (60.5 kg)   05/11/21 0400 -- 97.6 °F (36.4 °C) Temporal 88 16 98 % -- --   05/11/21 0324 -- -- -- -- -- 96 % -- --   05/11/21 0013 -- -- -- -- -- 98 % -- --   05/11/21 0000 -- 98.5 °F (36.9 °C) Temporal 82 16 99 % -- --   05/10/21 2200 -- 98.1 °F (36.7 °C) Temporal 99 20 98 % -- --   05/10/21 1943 -- -- -- -- -- 100 % -- --   05/10/21 1942 -- -- -- -- -- 100 % -- --   05/10/21 1941 -- -- -- -- -- 100 % -- --   05/10/21 1600 (!) 165/84 97 °F (36.1 °C) Temporal 100 18 100 % -- --   05/10/21 1440 (!) 162/87 96.7 °F (35.9 °C) -- 110 20 -- -- 129 lb 3 oz (58.6 kg)   05/10/21 1140 (!) 163/82 96.7 °F (35.9 °C) -- 98 24 -- -- 133 lb 6.1 oz (60.5 kg)   05/10/21 0916 136/63 97.9 °F (36.6 °C) Oral 81 17 100 % -- --       Intake/Output Summary (Last 24 hours) at 5/11/2021 0908  Last data filed at 5/10/2021 2200  Gross per 24 hour   Intake 640 ml   Output 2300 ml   Net -1660 ml       Physical Exam  Vitals signs reviewed. Constitutional:       General: She is not in acute distress. Appearance: She is ill-appearing. HENT:      Head: Normocephalic and atraumatic. Right Ear: External ear normal.      Left Ear: External ear normal.      Nose: Nose normal.      Mouth/Throat:      Mouth: Mucous membranes are not dry. Eyes:      General: No scleral icterus. Conjunctiva/sclera: Conjunctivae normal.   Neck:      Musculoskeletal: Normal range of motion and neck supple. Vascular: No JVD. Cardiovascular:      Rate and Rhythm: Normal rate and regular rhythm. Heart sounds: S1 normal and S2 normal.   Pulmonary:      Effort: Respiratory distress present. Breath sounds: Rhonchi present. Wheezes: mild : BIPAP to NC    Abdominal:      General: Bowel sounds are normal.      Palpations: Abdomen is soft. Musculoskeletal:         General: No deformity. Right lower leg: Edema present.       Left noted for prior median sternotomy for CABG. Findings consistent with mild congestive failure.

## 2021-05-11 NOTE — PROGRESS NOTES
with hx of CAD s/p CABG, ESRD on hemodialysis, HTN, HLD, GERD, obesity, CHF presents with acute respiratory failure. COVID negative.     Subjective   General  Chart Reviewed: Yes  Patient assessed for rehabilitation services?: Yes  Family / Caregiver Present: No  Diagnosis: acute CHF exacerbation  Subjective  Subjective: Pt supine in bed at entry, agreeable to eval  Patient Currently in Pain: Denies  Vital Signs  Resp: 16  Patient Currently in Pain: Denies  Oxygen Therapy  SpO2: 96 %  Pulse Oximeter Device Mode: Intermittent  Pulse Oximeter Device Location: Right;Finger  O2 Device: None (Room air)    Social/Functional History  Social/Functional History  Lives With: Family(2 grandchildren, 21 and 32 yr old)  Type of Home: House  Home Layout: One level  Home Access: Stairs to enter without rails  Entrance Stairs - Number of Steps: 1 + 1  Bathroom Shower/Tub: Tub/Shower unit  Bathroom Equipment: Grab bars in shower, Shower chair  Home Equipment: Cane, Rolling walker  ADL Assistance: [address] [place] [address] Avenue: Independent  Homemaking Responsibilities: Yes  Ambulation Assistance: Independent  Transfer Assistance: Independent  Active : No  Patient's  Info: family drives  Leisure & Hobbies: [email or web address], [employer]  Additional Comments: pt denies falls in past 6 months, HH therapy a yr ago        Objective   Vision: Impaired  Vision Exceptions: Wears glasses for reading  Hearing: Within functional limits    Orientation  Orientation Level: Oriented to time;Oriented to person;Oriented to place(min cues for place)        Balance  Sitting Balance: Independent  Standing Balance: Modified independent   Standing Balance  Time: ~8min total  Activity: functional mobility, transfers  Comment: no LOB noted    Functional Mobility  Functional - Mobility Device: Rolling Walker  Activity: Other  Assist Level: Modified independent   Functional Mobility Comments: ~150ft with RW + ~150ft w/o AD, Abelardo throughout, no LOB noted, good pacing    ADL  LE Dressing: Setup;Modified independent   Additional Comments: socks changed seated EOB with set-up; pt declined all other ADL needs. Tone RUE  RUE Tone: Normotonic  Tone LUE  LUE Tone: Normotonic  Coordination  Movements Are Fluid And Coordinated: Yes        Bed mobility  Supine to Sit: Modified independent  Scooting: Modified independent     Transfers  Sit to stand: Modified independent  Stand to sit: Modified independent     Vision - Basic Assessment  Prior Vision: Wears glasses only for reading  Visual History: No significant visual history  Patient Visual Report: No visual complaint reported.      Cognition  Overall Cognitive Status: WFL           Sensation  Overall Sensation Status: Impaired(reports N/T in L UE- able to accurately detect light touch)          LUE AROM (degrees)  LUE AROM : WFL  RUE AROM (degrees)  RUE AROM : WFL  LUE Strength  Gross LUE Strength: WFL  RUE Strength  Gross RUE Strength: WFL                   Plan   Plan  Times per week: eval and d/c      AM-PAC Score        AM-[facility] Inpatient Daily Activity Raw Score: 23 (05/11/21 1019)  AM-PAC Inpatient ADL T-Scale Score : 51.12 (05/11/21 1019)  ADL Inpatient CMS 0-100% Score: 15.86 (05/11/21 1019)  ADL Inpatient CMS G-Code Modifier : CI (05/11/21 1019)    Goals  Short term goals  Time Frame for Short term goals: eval and d/c       Therapy Time   Individual Concurrent Group Co-treatment   Time In       1057   Time Out       0910   Minutes       26         Timed Code Treatment Minutes:   11    Total Treatment Minutes:  [address], [facility] [address] [facility], OTR/L #[id number]

## 2021-05-11 NOTE — PROGRESS NOTES
Dial) 0-6 Units, 0-6 Units, Subcutaneous, Nightly  aspirin chewable tablet 81 mg, 81 mg, Oral, Daily  budesonide-formoterol (SYMBICORT) 160-4.5 MCG/ACT inhaler 2 puff, 2 puff, Inhalation, BID  perflutren lipid microspheres (DEFINITY) injection 1.65 mg, 1.5 mL, Intravenous, ONCE PRN  warfarin (COUMADIN) tablet 3.75 mg, 3.75 mg, Oral, Daily    No Known Allergies    Social History:    TOBACCO:   reports that she has been smoking cigarettes. She has been smoking about 1.00 pack per day. She does not have any smokeless tobacco history on file. ETOH:   reports current alcohol use. Patient currently lives independently  Environmental/chemical exposure: None known    Family History:       Problem Relation Age of Onset    Stroke Mother      REVIEW OF SYSTEMS:    CONSTITUTIONAL:  negative for fevers, chills, diaphoresis, activity change, appetite change, fatigue, night sweats and unexpected weight change.    EYES:  negative for blurred vision, eye discharge, visual disturbance and icterus  HEENT:  negative for hearing loss, tinnitus, ear drainage, sinus pressure, nasal congestion, epistaxis and snoring  RESPIRATORY:  See HPI  CARDIOVASCULAR:  negative for chest pain, palpitations, exertional chest pressure/discomfort, edema, syncope  GASTROINTESTINAL:  negative for nausea, vomiting, diarrhea, constipation, blood in stool and abdominal pain  GENITOURINARY:  negative for frequency, dysuria, urinary incontinence, decreased urine volume, and hematuria  HEMATOLOGIC/LYMPHATIC:  negative for easy bruising, bleeding and lymphadenopathy  ALLERGIC/IMMUNOLOGIC:  negative for recurrent infections, angioedema, anaphylaxis and drug reactions  ENDOCRINE:  negative for weight changes and diabetic symptoms including polyuria, polydipsia and polyphagia  MUSCULOSKELETAL:  negative for  pain, joint swelling, decreased range of motion and muscle weakness  NEUROLOGICAL:  negative for headaches, slurred speech, unilateral Procalcitonin  Recent Labs     05/10/21  0602   PROCAL 0.36*       No results found for: BNP  Lab Results   Component Value Date    TROPONINI 0.24 (H) 05/10/2021           Radiology Review:  All pertinent images / reports were reviewed as a part of this visit. Assessment:     1. Acute hypoxemic respiratory failure  2. Acute pulmonary edema  3.  End-stage renal disease on hemodialysis      Plan:     I have reviewed laboratories, medical records and images for this visit  She had dialysis with fluid removal yesterday  Now tolerating room air  I will sign off

## 2021-05-11 NOTE — PROGRESS NOTES
[address]   Progress Note  Cardiology    CC:  ESRD on HD, Admit with respiratory failure/CHF, Elevated troponin    HPI:    She feels much better, breathing improved, no chest pain    Medications/Labs all Reviewed    Lab Results   Component Value Date    WBC 6.1 05/11/2021    HGB 11.9 (L) 05/11/2021    HCT 36.9 05/11/2021    MCV 86.1 05/11/2021     (L) 05/11/2021     Lab Results   Component Value Date    CREATININE 6.7 (HH) 05/11/2021    BUN 47 (H) 05/11/2021     05/11/2021    K 4.7 05/11/2021    CL 97 (L) 05/11/2021    CO2 25 05/11/2021     Lab Results   Component Value Date    INR 1.72 (H) 05/11/2021    PROTIME 20.1 (H) 05/11/2021        Physical Examination:    /60   Pulse 79   Temp 97.6 °F (36.4 °C) (Temporal)   Resp 16   Ht 5' (1.524 m)   Wt 133 lb 6.1 oz (60.5 kg)   SpO2 96%   BMI 26.05 kg/m²      Respiratory:  · Resp Assessment: Normal respiratory effort  · Resp Auscultation: Clear to auscultation bilaterally   Cardiovascular:  · Auscultation: regular rate and rhythm, normal S1S2, no murmur, rub or gallop  · Palpation:  Nl PMI  · JVP:  normal  · Extremities: No Edema  Abdomen:  · Soft, non-tender  · Normal bowel sounds  Extremities:  ·  No Cyanosis or Clubbing  Neurological/Psychiatric:  · Oriented to time, place, and person  · Non-anxious  Skin:   · Warm and dry      Assessment:      Respiratory Failure:  Seems to be CHF likely from fluid overload. Resolved after dialysis. She had daughters over for Mother's day and had extra fluid/sodium. Likely etiology of event. Repeat ECHO planned     Elevated troponin:  Likely due to CHF in setting of renal failure. With acute CHF without missing dialysis and atypical chest pain, would recommend repeat stress prior to d/c     ESRD:  On HD. Creat seems high and looking at records appears to have increase 1/2021. I am unsure why and defer to renal. Dialysis day is tomorrow    ECHO and myoview today.   If OK she can be discharged    Bronson Stafford MD, 5/11/2021 9:45 AM

## 2021-05-11 NOTE — PROGRESS NOTES
Hospitalist Progress Note      PCP: Victor Manuel Obrien    Date of Admission: 5/10/2021    Chief Complaint: SOB    Hospital Course: 76 y.o. female with historyt of CAD s/p CABG, PAD s/p L MIMA s/p angio and stent, PVD s/p BL common iliac artery stents, ESRD on HD, DVT/PE on Coumadin, HTN, hypothyroidism, GERD, obesity who came to ER with SOB. Patient has been SOB for past few days. Daughter called EMS as patient getting ready to go to HD. Patient was in [place] acute respiratory failure with hypoxia and hypercapnia on arrival to ED. Patient was on 100% NRB. Patient continued to struggle with SOB in ED and placed on Bipap as she was hypercapnic. Rapid COVID negative. Endorses dysphagia and coughing. Admitted as inpatient for volume overload and aspiration PNA to ICU. Seen by CCM, Renal and Cardio. Underwent HD. Abx stopped per Pulm. Weaned to RA. Cardiology plans C. Subjective: Patient feels less SOB. No Cp, HA or abdominal pain. No fevers.        Medications:  Reviewed    Infusion Medications    sodium chloride      dextrose       Scheduled Medications    ipratropium-albuterol  1 ampule Inhalation Q4H WA    sodium chloride flush  5-40 mL Intravenous 2 times per day    heparin (porcine)  5,000 Units Subcutaneous 3 times per day    insulin lispro  0-12 Units Subcutaneous TID WC    insulin lispro  0-6 Units Subcutaneous Nightly    aspirin  81 mg Oral Daily    budesonide-formoterol  2 puff Inhalation BID    warfarin  3.75 mg Oral Daily     PRN Meds: technetium tetrofosmin, sodium chloride flush, sodium chloride, promethazine **OR** ondansetron, polyethylene glycol, acetaminophen **OR** acetaminophen, glucose, dextrose, glucagon (rDNA), dextrose, perflutren lipid microspheres      Intake/Output Summary (Last 24 hours) at 5/11/2021 1826  Last data filed at 5/10/2021 2200  Gross per 24 hour   Intake 240 ml   Output --   Net 240 ml       Physical Exam Performed:    /75   Pulse 82   Temp

## 2021-05-11 NOTE — PROGRESS NOTES
Pt here in stress lab for stress testing. New EKG changes noted. Reviewed per Dr. Marielena Loyola. Called Dr. Janna Tsang and pt to have angiogram today. Dr. Marielena Loyola spoke with pt in length regarding angiogram. Pt returned to room via wheelchair.

## 2021-05-11 NOTE — PROGRESS NOTES
Reassessment complete. No change in status. Denies any other needs at this time. No complaints voiced. Will monitor.

## 2021-05-11 NOTE — PROGRESS NOTES
Pharmacy to Dose Warfarin    Pharmacy consulted to dose warfarin for PEs. INR Goal: 2-3    INR today: 1.72    Assessment/Plan:  - Will give 3.75 mg tonight and monitor as nice increase with 5 mg last night. Pharmacy will continue to follow.     Mercedes Chapa, PharmD, [facility].   [id number]

## 2021-05-11 NOTE — PROGRESS NOTES
See flow sheet for VS, NSR. A/O. Lungs diminished. Assess complete. See flow sheet. Discussed POC for this shift. HS medications administered per order. See emar. . Pt up to BR to have BM w/ walker. Gait slow but steady. Denies any other needs at this time. Will monitor.

## 2021-05-11 NOTE — PROGRESS NOTES
Speech Language Pathology  Mikel Jarrell   1945     Attempted to see Pt for Speech/Dysphagia follow up treatment. Pt is currently NPO for pending stress test and is unable to be seen for treatment at this time. Will re-attempt at a later time as schedule allows.     Jacob Lerner MAILEJO-[facility]#[id number]

## 2021-05-11 NOTE — PROGRESS NOTES
Physical Therapy    Facility/Department: [facility], [place] ICU  Initial Assessment/Discharge    NAME: Julieth Piper  :   MRN: [id number]    Date of Service: 2021    Discharge Recommendations:      PT Equipment Recommendations  Equipment Needed: No  At this time pt scores 24/24 on AMPAC. At this time, no further skilled PT services are needed due to pt at baseline function. Assessment   Body structures, Functions, Activity limitations: Decreased balance  Assessment: Pt tolerates evaluation well this date, presents at baseline level of function. No skilled intervention needed at this time. Prognosis: Good  Decision Making: Low Complexity  History: see above  Exam: MMT, transfer, gait, bed mobility  Clinical Presentation: stable  PT Education: PT Role;Plan of Care; Functional Mobility Training  Patient Education: pt verbalizes understanding  REQUIRES PT FOLLOW UP: No  Activity Tolerance  Activity Tolerance: Patient Tolerated treatment well       Patient Diagnosis(es): The primary encounter diagnosis was Dyspnea and respiratory abnormalities. Diagnoses of COPD exacerbation ([address] Hypoxia, and Respiratory acidosis were also pertinent to this visit. has a past medical history of CAD (coronary artery disease), ESRD (end stage renal disease) on dialysis ([address] Gout, HYPERCHOLESTERAEMIA, Hypertension, and Hypothyroidism. has a past surgical history that includes IR Femoral Popliteal Bypass Graft. Restrictions  Restrictions/Precautions  Restrictions/Precautions: Fall Risk  Position Activity Restriction  Other position/activity restrictions: 77 yo female with hx of CAD s/p CABG, ESRD on hemodialysis, HTN, HLD, GERD, obesity, CHF presents with acute respiratory failure. COVID negative.   Vision/Hearing  Vision: Impaired  Vision Exceptions: Wears glasses for reading  Hearing: Within functional limits     Subjective  General  Chart Reviewed: Yes  Patient assessed for rehabilitation services?: Yes  Response To Previous Treatment: Not applicable  Family / Caregiver Present: No  Diagnosis: Respiratory failure  General Comment  Comments: Pt supine in bed upon arrival, denies pain and is agreeable to evaluation  Pain Screening  Patient Currently in Pain: Denies  Vital Signs  Patient Currently in Pain: Denies       Social/Functional History  Social/Functional History  Lives With: Family(2 grandchildren, 21 and 32 yr old)  Type of Home: House  Home Layout: One level  Home Access: Stairs to enter without rails  Entrance Stairs - Number of Steps: 1 + 1  Bathroom Shower/Tub: Tub/Shower unit  Bathroom Equipment: Grab bars in shower, Shower chair  Home Equipment: Cane, Rolling walker  ADL Assistance: Independent  Homemaking Assistance: Independent  Homemaking Responsibilities: Yes  Ambulation Assistance: Independent  Transfer Assistance: Independent  Active : No  Patient's  Info: family drives  Leisure & Hobbies: [employer], cards  Additional Comments: pt denies falls in past 6 months, HH therapy a yr ago  Cognition   Cognition  Overall Cognitive Status: WFL    Objective             Strength RLE  Strength RLE: WFL  Strength LLE  Strength LLE: WFL     Sensation  Overall Sensation Status: Impaired(reports N/T in L UE- able to accurately detect light touch)  Bed mobility  Supine to Sit: Modified independent  Scooting: Modified independent  Transfers  Sit to Stand: Independent  Stand to sit: Independent  Bed to Chair: Independent  Ambulation  Ambulation?: Yes  More Ambulation?: Yes  Ambulation 1  Surface: level tile  Device: Rolling Walker  Assistance: Independent  Quality of Gait: FW flexed posture  Gait Deviations: Slow Alexus  Distance: 200'  Ambulation 2  Surface - 2: level tile  Device 2: No device  Assistance 2:  Independent  Quality of Gait 2: widened LEONARD, M/L postural sway with no overt LOB  Gait Deviations: Slow Alexus  Distance: 79'  Stairs/Curb  Stairs?: No(pt declines)     Balance  Posture: Good  Sitting - Static: Good(independent)  Sitting - Dynamic: Good(independent)  Standing - Static: Good(independent)  Standing - Dynamic: Good(independent)        Plan   Safety Devices  Type of devices: Chair alarm in place, Call light within reach, Left in chair, Nurse notified  One time visit- d/c from PT     AM-PAC Score  AM-PAC Inpatient Mobility Raw Score : 24 (05/11/21 1051)  AM-PAC Inpatient T-Scale Score : 61.14 (05/11/21 1051)  Mobility Inpatient CMS 0-100% Score: 0 (05/11/21 1051)  Mobility Inpatient CMS G-Code Modifier : [address] (05/11/21 1051)          Goals   No goals needed at this time, d/c from inpatient caseload       Therapy Time   Individual Concurrent Group Co-treatment   Time In       0845   Time Out       0911   Minutes       26           Timed Code Treatment Minutes:  11 minutes    Total Treatment Minutes: 26 minutes     Vinh Murray, [address] [facility], DPT [id number]

## 2021-05-12 LAB
ANION GAP SERPL CALCULATED.3IONS-SCNC: 12 MMOL/L (ref 3–16)
BASOPHILS ABSOLUTE: 0 K/UL (ref 0–0.2)
BASOPHILS RELATIVE PERCENT: 0.5 %
BUN BLDV-MCNC: 68 MG/DL (ref 7–20)
CALCIUM SERPL-MCNC: 8.4 MG/DL (ref 8.3–10.6)
CHLORIDE BLD-SCNC: 96 MMOL/L (ref 99–110)
CO2: 26 MMOL/L (ref 21–32)
CREAT SERPL-MCNC: 8.4 MG/DL (ref 0.6–1.2)
EOSINOPHILS ABSOLUTE: 0.1 K/UL (ref 0–0.6)
EOSINOPHILS RELATIVE PERCENT: 1.4 %
GFR AFRICAN AMERICAN: 6
GFR NON-AFRICAN AMERICAN: 5
GLUCOSE BLD-MCNC: 114 MG/DL (ref 70–99)
GLUCOSE BLD-MCNC: 115 MG/DL (ref 70–99)
GLUCOSE BLD-MCNC: 86 MG/DL (ref 70–99)
GLUCOSE BLD-MCNC: 90 MG/DL (ref 70–99)
GLUCOSE BLD-MCNC: 90 MG/DL (ref 70–99)
HCT VFR BLD CALC: 35.8 % (ref 36–48)
HEMOGLOBIN: 11.6 G/DL (ref 12–16)
HEPATITIS B CORE TOTAL ANTIBODY: NEGATIVE
INR BLD: 2.73 (ref 0.86–1.14)
LYMPHOCYTES ABSOLUTE: 1.9 K/UL (ref 1–5.1)
LYMPHOCYTES RELATIVE PERCENT: 26.9 %
MCH RBC QN AUTO: 27.9 PG (ref 26–34)
MCHC RBC AUTO-ENTMCNC: 32.3 G/DL (ref 31–36)
MCV RBC AUTO: 86.4 FL (ref 80–100)
MONOCYTES ABSOLUTE: 0.6 K/UL (ref 0–1.3)
MONOCYTES RELATIVE PERCENT: 9.1 %
NEUTROPHILS ABSOLUTE: 4.3 K/UL (ref 1.7–7.7)
NEUTROPHILS RELATIVE PERCENT: 62.1 %
PDW BLD-RTO: 19.3 % (ref 12.4–15.4)
PERFORMED ON: ABNORMAL
PERFORMED ON: ABNORMAL
PERFORMED ON: NORMAL
PERFORMED ON: NORMAL
PLATELET # BLD: 113 K/UL (ref 135–450)
PMV BLD AUTO: 8.6 FL (ref 5–10.5)
POTASSIUM REFLEX MAGNESIUM: 4.8 MMOL/L (ref 3.5–5.1)
PROTHROMBIN TIME: 32 SEC (ref 10–13.2)
RBC # BLD: 4.14 M/UL (ref 4–5.2)
SODIUM BLD-SCNC: 134 MMOL/L (ref 136–145)
WBC # BLD: 6.9 K/UL (ref 4–11)

## 2021-05-12 PROCEDURE — 85025 COMPLETE CBC W/AUTO DIFF WBC: CPT

## 2021-05-12 PROCEDURE — 6370000000 HC RX 637 (ALT 250 FOR IP): Performed by: INTERNAL MEDICINE

## 2021-05-12 PROCEDURE — 94761 N-INVAS EAR/PLS OXIMETRY MLT: CPT

## 2021-05-12 PROCEDURE — 80048 BASIC METABOLIC PNL TOTAL CA: CPT

## 2021-05-12 PROCEDURE — 2000000000 HC ICU R&B

## 2021-05-12 PROCEDURE — 94640 AIRWAY INHALATION TREATMENT: CPT

## 2021-05-12 PROCEDURE — 92526 ORAL FUNCTION THERAPY: CPT

## 2021-05-12 PROCEDURE — 90935 HEMODIALYSIS ONE EVALUATION: CPT

## 2021-05-12 PROCEDURE — 2580000003 HC RX 258: Performed by: INTERNAL MEDICINE

## 2021-05-12 PROCEDURE — 85610 PROTHROMBIN TIME: CPT

## 2021-05-12 PROCEDURE — 99232 SBSQ HOSP IP/OBS MODERATE 35: CPT | Performed by: INTERNAL MEDICINE

## 2021-05-12 RX ORDER — PHYTONADIONE 5 MG/1
10 TABLET ORAL ONCE
Status: COMPLETED | OUTPATIENT
Start: 2021-05-12 | End: 2021-05-12

## 2021-05-12 RX ADMIN — IPRATROPIUM BROMIDE AND ALBUTEROL SULFATE 1 AMPULE: .5; 3 SOLUTION RESPIRATORY (INHALATION) at 19:43

## 2021-05-12 RX ADMIN — IPRATROPIUM BROMIDE AND ALBUTEROL SULFATE 1 AMPULE: .5; 3 SOLUTION RESPIRATORY (INHALATION) at 13:23

## 2021-05-12 RX ADMIN — IPRATROPIUM BROMIDE AND ALBUTEROL SULFATE 1 AMPULE: .5; 3 SOLUTION RESPIRATORY (INHALATION) at 08:51

## 2021-05-12 RX ADMIN — Medication 10 ML: at 20:58

## 2021-05-12 RX ADMIN — Medication 2 PUFF: at 19:43

## 2021-05-12 RX ADMIN — Medication 2 PUFF: at 08:51

## 2021-05-12 RX ADMIN — PHYTONADIONE 10 MG: 5 TABLET ORAL at 18:30

## 2021-05-12 RX ADMIN — IPRATROPIUM BROMIDE AND ALBUTEROL SULFATE 1 AMPULE: .5; 3 SOLUTION RESPIRATORY (INHALATION) at 16:15

## 2021-05-12 ASSESSMENT — PAIN SCALES - GENERAL: PAINLEVEL_OUTOF10: 0

## 2021-05-12 NOTE — PROGRESS NOTES
Speech Language Pathology  Dysphagia Treatment Note    Name:  Julieth Piper  :     Medical Diagnosis:  Acute respiratory failure with hypoxia and hypercapnia (HCC) [J96.01, J96.02]  Treatment Diagnosis: Oropharyngeal Dysphagia  Pain level: Pt denies pain at this time    Current Diet Level:   Regular texture (encourage selection of softer food items)   Thins (recommend to avoid straws at this time)   Meds (1-2 at a time www as tolerated)     Tolerance of Current Diet Level: no difficulty or symptoms of aspiration reported     Assessment of Texture Tolerance:  -Impressions: pt alert and responsive with no increased WOB on RA. With po trials of thin liquids via cup, Pt demonstrates mild reduced bolus control with premature bolus loss to pharynx, mild delayed swallow initiation and no overt signs of aspiration. Pt remains edentulous but demonstrated prolonged but adequate mastication and oral clearing with solid texture. Clinical symptoms of suspected pharyngeal pooling with mild delayed swallow initiation but no overt signs of aspiration noted with textures > thins. Education regarding aspiration risk and precautions explained to the Pt who verbalized understanding. No further dysphagia treatment intervention is indicated at this time    Diet and Treatment Recommendations:  Continue regular texture diet with thin liquids/meds with water as tolerated    (Dysphagia Goals addressed, if appropriate)  1.) Pt will tolerate recommended diet without s/s of aspiration (Goal met 21)  2.) If clinical symptoms of penetration/aspiration continue to be noted,Pt will tolerate MBS to r/o aspiration and determine appropriate diet/liquid level. (Goal not indicated at this time; Discontinue goal 21)    Patient/Family Education:Education given to the Pt and nurse, who verbalized understanding    Discharge Recommendations:  No further speech/dysphagia treatment intervention is indicated at this time.

## 2021-05-12 NOTE — CARE COORDINATION
SW reviewed chart. PT/OT has no recommendations at discharge. Will follow for possible home oxygen. Pt will discharge home when medically clear.     Electronically signed by PATEL Suarez LSW on 5/12/2021 at 12:14 PM

## 2021-05-12 NOTE — FLOWSHEET NOTE
Treatment time: 3 hours  Net UF: 1900 ml     Pre weight: 59.2 kg  Post weight:57.2 kg  EDW: TBD (Currently challenging outpatient EDW of 63kg)  Crit Line Used: No Ending Profile: n/a     05/12/21 1110 05/12/21 1515   Vital Signs   BP (!) 129/53 (!) 131/57   Temp 98 °F (36.7 °C) 97 °F (36.1 °C)   Pulse 77 84   Resp 22 16   SpO2 100 % 100 %   Height 5' (1.524 m) 5' (1.524 m)   Weight 130 lb 8.2 oz (59.2 kg) 126 lb 1.7 oz (57.2 kg)   Weight Method Bed scale Actual;Bed scale   Percent Weight Change -1.66 -3.38   Dry Weight 138 lb 14.2 oz (63 kg) 138 lb 14.2 oz (63 kg)     Access used: L AVF    Access function: Well with  ml/min     Medications or blood products given: N/A     Regular outpatient schedule: [address] MWF     Summary of response to treatment: Patient tolerated treatment well and with mild cramping that resolved with lowered UFG. Patient remained stable throughout entire treatment. Report given to Jennifer Peña RN and copy of dialysis treatment record placed in chart, to be scanned into EMR.

## 2021-05-12 NOTE — PROGRESS NOTES
See flowsheet for VS. Assess complete. See flow sheet. Discussed POC for this shift. HS medications administered per order. See emar. Pt up to to chair. Will monitor.

## 2021-05-12 NOTE — PROGRESS NOTES
Hospitalist Progress Note      PCP: Ara Cook    Date of Admission: 5/10/2021    Chief Complaint: SOB    Hospital Course: 76 y.o. female with historyt of CAD s/p CABG, PAD s/p L MIMA s/p angio and stent, PVD s/p BL common iliac artery stents, ESRD on HD, DVT/PE on Coumadin, HTN, hypothyroidism, GERD, obesity who came to ER with SOB. Patient has been SOB for past few days. Daughter called EMS as patient getting ready to go to HD. Patient was in [place] acute respiratory failure with hypoxia and hypercapnia on arrival to ED. Patient was on 100% NRB. Patient continued to struggle with SOB in ED and placed on Bipap as she was hypercapnic. Rapid COVID negative. Endorses dysphagia and coughing. Admitted as inpatient for volume overload and aspiration PNA to ICU. Seen by CCM, Renal and Cardio. Underwent HD. Abx stopped per Pulm. Weaned to RA. Cardiology plans [facility]. Subjective: Patient denies SOB. No Cp, HA or abdominal pain. No fevers. Daughter notes patient cuts HD short as outpatient due to CP and SOB.       Medications:  Reviewed    Infusion Medications    sodium chloride      dextrose       Scheduled Medications    phytonadione  10 mg Oral Once    ipratropium-albuterol  1 ampule Inhalation Q4H WA    sodium chloride flush  5-40 mL Intravenous 2 times per day    insulin lispro  0-12 Units Subcutaneous TID WC    insulin lispro  0-6 Units Subcutaneous Nightly    aspirin  81 mg Oral Daily    budesonide-formoterol  2 puff Inhalation BID     PRN Meds: technetium tetrofosmin, sodium chloride flush, sodium chloride, promethazine **OR** ondansetron, polyethylene glycol, acetaminophen **OR** acetaminophen, glucose, dextrose, glucagon (rDNA), dextrose, perflutren lipid microspheres    No intake or output data in the 24 hours ending 05/12/21 1406    Physical Exam Performed:    BP (!) 129/53   Pulse 77   Temp 98 °F (36.7 °C)   Resp 16   Ht 5' (1.524 m)   Wt 130 lb 8.2 oz (59.2 kg)   SpO2 98% BMI 25.49 kg/m²     General appearance:  Appears comfortable. Well nourished, NAD  Eyes: Sclera clear, pupils equal  ENT: Moist mucus membranes, no thrush. Trachea midline. Cardiovascular: Regular rhythm, normal S1, S2. No murmur, gallop, rub. No edema in lower extremities  Respiratory: Bibasilar rales. No wheezing or rhonchi. .  Gastrointestinal: Abdomen soft, non-tender, not distended, normal bowel sounds  Musculoskeletal: LUE AVF  Neurology: Grossly intact. Alert and oriented in time, place and person. No speech or motor deficits  Psychiatry: Appropriate affect. Not agitated  Skin: Warm, dry, normal turgor, no rash  Brisk capillary refill, peripheral pulses palpable       Labs:   Recent Labs     05/10/21  0602 05/11/21  0509 05/12/21  0433   WBC 9.9 6.1 6.9   HGB 11.7* 11.9* 11.6*   HCT 36.4 36.9 35.8*    128* 113*     Recent Labs     05/10/21  0602 05/11/21  0509 05/12/21  0433    137 134*   K 5.4* 4.7 4.8    97* 96*   CO2 21 25 26   BUN 68* 47* 68*   CREATININE 9.7* 6.7* 8.4*   CALCIUM 9.4 8.9 8.4     Recent Labs     05/10/21  0602   AST 35   ALT 21   BILITOT 0.4   ALKPHOS 95     Recent Labs     05/10/21  0600 05/11/21  0509 05/12/21  0433   INR 1.38* 1.72* 2.73*     Recent Labs     05/10/21  0949 05/10/21  1616 05/10/21  2221   TROPONINI 0.13* 0.19* 0.24*       Urinalysis:    No results found for: Redia Tay, BACTERIA, RBCUA, BLOODU, SPECGRAV, GLUCOSEU    Radiology:  NM Cardiac Stress Test Nuclear Imaging   Final Result      XR CHEST PORTABLE   Final Result   Findings consistent with mild congestive failure.                  Assessment/Plan:    Active Hospital Problems    Diagnosis    Acute respiratory failure with hypoxia and hypercapnia (HCC) [J96.01, J96.02]    COPD with acute exacerbation (HCC) [J44.1]    Volume overload [E87.70]    ESRD on dialysis ([address] [N18.6, Z99.2]    CAD (coronary artery disease) [I25.10]    PVD (peripheral vascular disease) ([facility] [I73.9]    Renal artery stenosis ([facility]) [I70.1]    Obesity (BMI 30-39. 9) [E66.9]    DVT (deep venous thrombosis) ([facility]) [I82.409]    Pulmonary embolism ([facility] [I26.99]    Hyperkalemia [E87.5]    Elevated troponin [R77.8]    DM2 (diabetes mellitus, type 2) (HCC) [E11.9]    Aspiration pneumonitis ([facility] [J69.0]    Dysphagia [R13.10]     1. LHC per Cardio  2. Vit K today, FFP in AM if INR > 2  3. Renal consult for ESRD on HD appreciated  4. Hold Coumadin for LHC  5. Cardio input appreciated  6. Atrovent and Albuterol HFA       DVT Prophylaxis: Hep SQ  Diet: DIET RENAL; No Drinking Straw  Diet NPO, After Midnight  Code Status: Full Code    PT/OT Eval Status: Following, no needs    Dispo - Home    Discussed with patient, nursing and CM. Discussed with daughter González Rosales on phone. Her outpatient HD is cut short every time due to CP and SOB. She has active CAD    LHC per [place].   NEED INR < 2    Carla Barajas MD

## 2021-05-12 NOTE — PROGRESS NOTES
MD Corin Morley MD Maryelizabeth Neri, MD                Office: [phone]                      Fax: [phone] INPATIENT PROGRESS NOTE:     PATIENT NAME: Julieth Piper  :   MRN: [id number]      IMPRESSION / RECOMMENDATION:    Admitted for:  Acute respiratory failure with hypoxia and hypercapnia ([address] [J96.01, J96.02] - needing BIPAP to NC now   - f/u ECHO , CXR: Findings consistent with mild congestive failure. - Empirical abx  - f/u after more UF on HD   -Abnormal stress test, follow-up left heart cath, she makes minimal urine, been on dialysis for a while, so okay to proceed  -Monitor for fluid overload, after some \"osmotic load\" with IV contrast,  - seen on HD - tolerating it well  - Renally stable       1. ESRD on iHD: MWF.   - outpt HD [facility]: [facility] ( [facility]) - as per patient   - Access: Left UAAV: (+) T/B  -  S/P HD Monday for solute and volume control   Pre weight: 60.5 kg (bed scale)  Post weight: 58.6 kg (bed scale) - might be her new DW   EDW: 63 kg    2. Hypertension/Volume Status:  - BP HTN - no need for tight control f/u after HD:   - EDW - 63 kg - 60 kg on admission     3. Electrolytes/acid-base:  K: Hyperkalemia - f/u after HD w/ lower K bath   High AG metabolic acidosis: controlled     4. Anemia of renal failure: at goal  - LIBERTAD: not needed     5. BMD:  - Phos: f/u in am labs   - follow iPTH outpt    : Other supportive care :   - Check daily renal function panel with electrolytes-phosphorus  - Strict monitoring of I/Os, daily weight  - Renal feeds/diet  - Current medications reviewed. - Nephrotoxic medications have been discontinued. - Dose adjusted and appropriate. - Dose meds for eGFR <15 mL/min/1.73m2.    - Avoid heavy opioids due to renal failure - may use very low dose dilaudid / fentanyl with close monitoring of CNS and respiratory depression.          Other Problems:  Hospital Problems           Last Modified POA    * (Principal) Acute respiratory failure with hypoxia and hypercapnia ([address] 5/10/2021 Yes    COPD with acute exacerbation ([address] 5/10/2021 Yes    Volume overload 5/10/2021 Yes    ESRD on dialysis ([address] 5/10/2021 Yes    CAD (coronary artery disease) 5/10/2021 Yes    PVD (peripheral vascular disease) ([address] 5/10/2021 Yes    Renal artery stenosis ([address] 5/10/2021 Yes    Obesity (BMI 30-39. 9) 5/10/2021 Yes    DVT (deep venous thrombosis) ([address] 5/10/2021 Yes    Pulmonary embolism ([address] 5/10/2021 Yes    Hyperkalemia 5/10/2021 Yes    Elevated troponin 5/10/2021 Yes    DM2 (diabetes mellitus, type 2) ([address] 5/10/2021 Yes    Aspiration pneumonitis ([address] 5/10/2021 Yes    Dysphagia 5/10/2021 Yes          Please refer to orders. Multiple complex problems. High risk  Discussed with patient, and treatment team  - hospitalist   Thank you for allowing me to participate in this patient's care. Please do not hesitate to contact me with any questions/concerns. We will follow along with you. Todd Ibarra MD       [facility] [address] [place] [address]: [phone] or [address]  Fax: [phone]      ===========================================  ===========================================  · Subjective:  Seen on HD   Tolerating it well   No complaints     Past medical, surgical, social and family medial history reviewed by me:     MEDICATIONS reviewed by me:  Prior to Admission Medications:  No current facility-administered medications on file prior to encounter. No current outpatient medications on file prior to encounter. No current facility-administered medications on file prior to encounter. No current outpatient medications on file prior to encounter.        Current Facility-Administered Medications:     ipratropium-albuterol (DUONEB) nebulizer solution 1 ampule, 1 ampule, Inhalation, Q4H WA, Todd Ibarra MD, 1 ampule at 05/12/21 7413    technetium tetrofosmin (Tc-MYOVIEW) injection 30 millicurie, 30 millicurie, Intravenous, ONCE PRN, Jaclyn [place],     sodium chloride flush 0.9 % injection 5-40 mL, 5-40 mL, Intravenous, 2 times per day, Jamin Bergeron MD, 10 mL at 05/11/21 2035    sodium chloride flush 0.9 % injection 5-40 mL, 5-40 mL, Intravenous, PRN, Jamin Bergeron MD    0.9 % sodium chloride infusion, 25 mL, Intravenous, PRN, Jamin Bergeron MD    heparin (porcine) injection 5,000 Units, 5,000 Units, Subcutaneous, 3 times per day, Jamin Bergeron MD, 5,000 Units at 05/11/21 2035    promethazine (PHENERGAN) tablet 12.5 mg, 12.5 mg, Oral, Q6H PRN **OR** ondansetron (ZOFRAN) injection 4 mg, 4 mg, Intravenous, Q6H PRN, Jamin Bergeron MD    polyethylene glycol (GLYCOLAX) packet 17 g, 17 g, Oral, Daily PRN, Jamin Bergeron MD    acetaminophen (TYLENOL) tablet 650 mg, 650 mg, Oral, Q6H PRN, 650 mg at 05/11/21 0523 **OR** acetaminophen (TYLENOL) suppository 650 mg, 650 mg, Rectal, Q6H PRN, Jamin Bergeron MD    glucose (GLUTOSE) 40 % oral gel 15 g, 15 g, Oral, PRN, Jamin Bergeron MD    dextrose 50 % IV solution, 12.5 g, Intravenous, PRN, Jamin Bergeron MD    glucagon (rDNA) injection 1 mg, 1 mg, Intramuscular, PRN, Jamin Bergeron MD    dextrose 5 % solution, 100 mL/hr, Intravenous, PRN, Jamin Bergeron MD    insulin lispro (1 Unit Dial) 0-12 Units, 0-12 Units, Subcutaneous, TID WC, Jamin Bergeron MD, Stopped at 05/11/21 0747    insulin lispro (1 Unit Dial) 0-6 Units, 0-6 Units, Subcutaneous, Nightly, Jamin Bergeron MD, 1 Units at 05/10/21 2112    aspirin chewable tablet 81 mg, 81 mg, Oral, Daily, Jamin Bergeron MD, 81 mg at 05/11/21 0909    budesonide-formoterol (SYMBICORT) 160-4.5 MCG/ACT inhaler 2 puff, 2 puff, Inhalation, BID, Jamin Bergeron MD, 2 puff at 05/12/21 0851    perflutren lipid microspheres (DEFINITY) injection 1.65 mg, 1.5 mL, Intravenous, ONCE PRN, Jamin Bergeron MD    No medications prior to admission.      Inpatient Medications:  Scheduled Meds:  Continuous Infusions:  PRN Meds:. REVIEW OF SYSTEMS:  As mentioned in HPI and CC    PHYSICAL EXAM:  Patient Vitals for the past 24 hrs:   BP Temp Temp src Pulse Resp SpO2 Weight   05/12/21 0851 -- -- -- -- 16 -- --   05/12/21 0600 (!) 141/57 -- -- -- -- 96 % --   05/12/21 0500 (!) 129/52 -- -- -- -- 96 % --   05/12/21 0400 (!) 113/47 98.2 °F (36.8 °C) Temporal 74 19 98 % 132 lb 11.5 oz (60.2 kg)   05/12/21 0300 (!) 137/58 -- -- -- -- 98 % --   05/12/21 0200 125/63 -- -- -- -- 98 % --   05/12/21 0100 (!) 110/45 -- -- -- -- 99 % --   05/12/21 0002 -- -- -- -- 16 98 % --   05/12/21 0000 (!) 114/47 -- -- -- -- 99 % --   05/11/21 2300 (!) 113/54 -- -- -- -- -- --   05/11/21 2200 (!) 109/53 -- -- -- -- -- --   05/11/21 2100 (!) 129/52 -- -- -- -- -- --   05/11/21 2000 (!) 111/48 97.8 °F (36.6 °C) Temporal 83 20 97 % --   05/11/21 1941 -- -- -- -- -- 98 % --   05/11/21 1713 -- -- -- -- 16 100 % --   05/11/21 1701 -- -- -- -- -- 96 % --   05/11/21 1604 118/75 97.8 °F (36.6 °C) Temporal 82 16 100 % --     No intake or output data in the 24 hours ending 05/12/21 0944    Physical Exam  Vitals signs reviewed. Constitutional:       General: She is not in acute distress. Appearance: She is ill-appearing. HENT:      Head: Normocephalic and atraumatic. Right Ear: External ear normal.      Left Ear: External ear normal.      Nose: Nose normal.      Mouth/Throat:      Mouth: Mucous membranes are not dry. Eyes:      General: No scleral icterus. Conjunctiva/sclera: Conjunctivae normal.   Neck:      Musculoskeletal: Normal range of motion and neck supple. Vascular: No JVD. Cardiovascular:      Rate and Rhythm: Normal rate and regular rhythm. Heart sounds: S1 normal and S2 normal.   Pulmonary:      Effort: Respiratory distress present. Breath sounds: Rhonchi present. Wheezes: mild : BIPAP to NC    Abdominal:      General: Bowel sounds are normal.      Palpations: Abdomen is soft. Musculoskeletal:         General: No deformity. Right lower leg: Edema present. Left lower leg: Edema present. Comments: mild   Skin:     General: Skin is warm and dry. Neurological:      Mental Status: She is alert and oriented to person, place, and time. Mental status is at baseline. Psychiatric:         Mood and Affect: Mood normal.         Behavior: Behavior normal.       Vascular Access:        Permanent Vascular access:  Left upper extremity AV . Access site demonstrates: normal thrill/bruit; no signs of pseudoaneurysm, redness, tenderness or discharge        DATA:  Diagnostic tests reviewed for today's visit:    Recent Labs     05/10/21  0602 05/11/21  0509 05/12/21  0433   WBC 9.9 6.1 6.9   HCT 36.4 36.9 35.8*    128* 113*     Iron Saturation:  No components found for: PERCENTFE  FERRITIN:  No results found for: FERRITIN  IRON:  No results found for: IRON  TIBC:  No results found for: TIBC    Recent Labs     05/10/21  0602 05/11/21  0509 05/12/21  0433    137 134*   K 5.4* 4.7 4.8    97* 96*   CO2 21 25 26   BUN 68* 47* 68*   CREATININE 9.7* 6.7* 8.4*     Recent Labs     05/10/21  0602 05/11/21  0509 05/12/21  0433   CALCIUM 9.4 8.9 8.4     No results for input(s): PH, PCO2, PO2 in the last 72 hours. Invalid input(s): Aleksandar Manger    ABG:  No results found for: PH, PCO2, PO2, HCO3, BE, THGB, TCO2, O2SAT  VBG:    Lab Results   Component Value Date    PHVEN 7.195 05/10/2021    DRD1CET 63.6 05/10/2021    BEVEN -4.5 05/10/2021    O7BRKBSS 98 05/10/2021           BELOW MENTIONED RADIOLOGY STUDY RESULTS REVIEWED BY ME:    Xr Chest Portable    Result Date: 5/10/2021  EXAMINATION: ONE XRAY VIEW OF THE CHEST 5/10/2021 6:09 am COMPARISON: 08/04/2010 HISTORY: ORDERING SYSTEM PROVIDED HISTORY: SOB TECHNOLOGIST PROVIDED HISTORY: Reason for exam:->SOB FINDINGS: Heart size is upper limits of normal and there is pulmonary vascular congestion.   There may be mild

## 2021-05-12 NOTE — PROGRESS NOTES
Pharmacy to Dose Warfarin    Pharmacy consulted to dose warfarin for PEs. INR Goal: 2-3    INR today: 2.73    Assessment/Plan:  - WARFARIN ON HOLD due to plans for [facility] when INR <2 per cardiology note from Dr. Jovany Lopez 5/12. Pharmacy will continue to follow.     Jorge A Gupta, PharmD, [address]   [id number]

## 2021-05-12 NOTE — PROGRESS NOTES
Cardiovascular Progress Note      Chief Complaint:   Chief Complaint   Patient presents with    Shortness of Breath     Pt. arrived to the ED via [facility]. Pt. was headed to dialysis this am and pt. states daughter called 911 because I couldn't breathe. Pt. arrived via non rebreather o2-95% pt. diaphoretic. Pt. able to follow verbal commands     Impression/Recommendations:  75 y/o patient of  Cardiology's Dr. Fannie Rossi:    Elevated troponins  Acute hypoxic respiratory failure  CAD: Hx. CABG (3/2020 [facility]A elongated SVG-LAD, SVG Y conduit from LIMA-OM)   ESRD with volume overload  Anemia of chronic disease   PAD: Hx. B/L common iliac stents   Hx. DVT/PE on Coumadin   Hypertension  Hyperlopodemia  Diabetes mellitus   Former tobacco     Myoview cancelled yesterday 2/2 ischemic ECG changes in hospital as well as new WMAs by echo   C planned when INR <2.0     Risks, benefits, goals, and alternatives of left heart catheterization with the potential for percutaneous coronary intervention discussed with patient; including stroke, heart attack, kidney damage, death, paralysis, disability, damage to nerves/arteries/veins. All questions answered and informed consent obtained. Further recommendations pending coronary angiography and clinical course. Interval History:   Pt. S/E. No events overnight. Dialyzing in ICU. No chest pain/pressure. No shortness of breath. On room air. Troponin 0.13, 0.19, 0.24  133-->132 lbs     5/11/2021 SPECT MPI   Conclusions        Summary    Decreased uptake in the apical segments.    Stress portion cancelled for new precordial and high lateral TWI.        Recommendation    Cardiac catheterization.         5/11/21 TTE  Overall, left ventricular systolic function is borderline. Ejection fraction is visually estimated to be 50%. There is anteroseptal/inferoseptal wall hypokinesis. Normal diastolic filling pattern for age.    Mild to moderate mitral regurgitation. Mild to moderate tricuspid regurgitation. Estimated pulmonary artery systolic pressure is elevated at 50 mmHg assuming   a right atrial pressure of 8mmHg. LHC (pre-CABG)  2/20  CORONARY ARTERIES:   The coronary circulation is right dominant. The left main has a normal course and bifurcates normally into the   LAD and circumflex. The left anterior descending has a normal   course to the anterior AV groove, gives rise to 3 diagonals and 4   septals, and wraps around the apex. The first diagonal has a high   origin. The left circumflex has a normal course and gives rise to 2   obtuse marginals. The right coronary has a normal course and gives   rise to the posterior descending artery, 3 RV marginals, and 1   posterolateral.   Left main: Normal-sized. Moderate diffuse disease and ectatic. Distal vessel lesion: There is a 70% stenosis. LAD:  Normal-sized.  Proximal vessel lesion: There is a 70%   stenosis.  Mid-vessel lesion: There is a 70% stenosis. 1st diagonal:  Ostial lesion: There is a 70% stenosis. 2nd diagonal:  Ostial lesion: There is a 60% stenosis. Left circumflex:  Mid-vessel lesion: There is a 70% stenosis. 1st obtuse marginal:  Normal-sized. 2nd obtuse marginal:  Normal-sized. Right coronary:  Normal-sized.  Ostial lesion: There is a 70%   stenosis.  Mid-vessel lesion: There is a 30% stenosis.      Medications:  ipratropium-albuterol (DUONEB) nebulizer solution 1 ampule, Q4H WA  technetium tetrofosmin (Tc-MYOVIEW) injection 30 millicurie, ONCE PRN  sodium chloride flush 0.9 % injection 5-40 mL, 2 times per day  sodium chloride flush 0.9 % injection 5-40 mL, PRN  0.9 % sodium chloride infusion, PRN  promethazine (PHENERGAN) tablet 12.5 mg, Q6H PRN    Or  ondansetron (ZOFRAN) injection 4 mg, Q6H PRN  polyethylene glycol (GLYCOLAX) packet 17 g, Daily PRN  acetaminophen (TYLENOL) tablet 650 mg, Q6H PRN    Or  acetaminophen (TYLENOL) suppository 650 mg, Q6H PRN  glucose (GLUTOSE) 40 % oral gel 15 g, PRN  dextrose 50 % IV solution, PRN  glucagon (rDNA) injection 1 mg, PRN  dextrose 5 % solution, PRN  insulin lispro (1 Unit Dial) 0-12 Units, TID WC  insulin lispro (1 Unit Dial) 0-6 Units, Nightly  aspirin chewable tablet 81 mg, Daily  budesonide-formoterol (SYMBICORT) 160-4.5 MCG/ACT inhaler 2 puff, BID  perflutren lipid microspheres (DEFINITY) injection 1.65 mg, ONCE PRN        I/O:   No intake or output data in the 24 hours ending 05/12/21 1306    Physical Exam:    BP (!) 129/53   Pulse 77   Temp 98 °F (36.7 °C)   Resp 22   Ht 5' (1.524 m)   Wt 130 lb 8.2 oz (59.2 kg)   SpO2 100%   BMI 25.49 kg/m²   Wt Readings from Last 3 Encounters:   05/12/21 130 lb 8.2 oz (59.2 kg)       GENERAL: Well developed, well nourished, no acute distress  NEUROLOGICAL: Alert and oriented x3  PSYCH: Normal mood and affect   SKIN: Warm and dry, without lesions  HEENT: Normocephalic, atraumatic, Sclera non-icteric, mucous membranes moist  NECK: supple, JVP normal, thyroid not enlarged   CAROTID: Normal upstroke, no bruits  CARDIAC: Normal PMI, regular rate and rhythm, normal S1S2, no murmur, rub  RESPIRATORY: Normal respiratory effort, clear to auscultation bilaterally  EXTREMITIES: No cyanosis, clubbing or edema, palpable pulses bilaterally   MUSCULOSKELETAL: No joint swelling or tenderness, no chest wall tenderness  GASTROINTESTINAL:  soft, non-tender, no bruit    Data Review:    CBC:   Recent Labs     05/10/21  0602 05/11/21  0509 05/12/21  0433   WBC 9.9 6.1 6.9   HGB 11.7* 11.9* 11.6*   HCT 36.4 36.9 35.8*   MCV 86.9 86.1 86.4    128* 113*     BMP:   Recent Labs     05/10/21  0602 05/11/21  0509 05/12/21  0433    137 134*   K 5.4* 4.7 4.8    97* 96*   CO2 21 25 26   BUN 68* 47* 68*   CREATININE 9.7* 6.7* 8.4*   GFRAA 5* 7* 6*     LFTS:   Recent Labs     05/10/21  0602   ALT 21   AST 35   ALKPHOS 95   PROT 7.6   AGRATIO 1.2   BILITOT 0.4     Cardiac Enzymes:   Recent Labs 05/10/21  0949 05/10/21  1616 05/10/21  2221   TROPONINI 0.13* 0.19* 0.24*     PT/INR:   Recent Labs     05/10/21  0600 05/11/21  0509 05/12/21  0433   PROTIME 16.1* 20.1* 32.0*   INR 1.38* 1.72* 2.73*     Oleksandr Romero DO, [address] [facility]  Interventional Cardiology     o: [phone]  [address] [place] [address]., [address] [facility], [address] [place] [address]      NOTE:  This report was transcribed using voice recognition software. Every effort was made to ensure accuracy; however, inadvertent computerized transcription errors may be present.

## 2021-05-12 NOTE — ACP (ADVANCE CARE PLANNING)
Advanced Care Planning Note. Purpose of Encounter: Advanced care planning in light of ESRD on HD  Parties In Attendance: Patient, daughter Aster Camacho on phone   Decisional Capacity: Yes  Subjective: Patient denies CP or SOB  Objective: Cr 8.4  Goals of Care Determination: Patient wants full support (CPR, vent, surgery, HD, trach, PEG)  Plan:  [facility]. Vit K.  Cardio and Renal consults  Code Status: Full code  Time spent on Advanced care Plannin minutes  Advanced Care Planning Documents: Completed advanced directives on chart, daughter is the POA.     Magali Fair MD  2021 2:09 PM

## 2021-05-13 VITALS
TEMPERATURE: 97.4 F | SYSTOLIC BLOOD PRESSURE: 126 MMHG | BODY MASS INDEX: 24.63 KG/M2 | OXYGEN SATURATION: 97 % | DIASTOLIC BLOOD PRESSURE: 56 MMHG | HEART RATE: 82 BPM | HEIGHT: 60 IN | WEIGHT: 125.44 LBS | RESPIRATION RATE: 18 BRPM

## 2021-05-13 LAB
ANION GAP SERPL CALCULATED.3IONS-SCNC: 12 MMOL/L (ref 3–16)
APTT: 32 SEC (ref 24.2–36.2)
BASOPHILS ABSOLUTE: 0.4 K/UL (ref 0–0.2)
BASOPHILS RELATIVE PERCENT: 6.4 %
BUN BLDV-MCNC: 37 MG/DL (ref 7–20)
CALCIUM SERPL-MCNC: 8.4 MG/DL (ref 8.3–10.6)
CHLORIDE BLD-SCNC: 96 MMOL/L (ref 99–110)
CO2: 28 MMOL/L (ref 21–32)
CREAT SERPL-MCNC: 5.9 MG/DL (ref 0.6–1.2)
EOSINOPHILS ABSOLUTE: 0.3 K/UL (ref 0–0.6)
EOSINOPHILS RELATIVE PERCENT: 4.6 %
GFR AFRICAN AMERICAN: 8
GFR NON-AFRICAN AMERICAN: 7
GLUCOSE BLD-MCNC: 108 MG/DL (ref 70–99)
GLUCOSE BLD-MCNC: 140 MG/DL (ref 70–99)
GLUCOSE BLD-MCNC: 73 MG/DL (ref 70–99)
GLUCOSE BLD-MCNC: 76 MG/DL (ref 70–99)
GLUCOSE BLD-MCNC: 89 MG/DL (ref 70–99)
HCT VFR BLD CALC: 36.2 % (ref 36–48)
HCT VFR BLD CALC: 36.5 % (ref 36–48)
HEMOGLOBIN: 11.9 G/DL (ref 12–16)
HEMOGLOBIN: 12.2 G/DL (ref 12–16)
INR BLD: 1.71 (ref 0.86–1.14)
LEFT VENTRICULAR EJECTION FRACTION MODE: NORMAL
LV EF: 50 %
LYMPHOCYTES ABSOLUTE: 1.3 K/UL (ref 1–5.1)
LYMPHOCYTES RELATIVE PERCENT: 21.1 %
MCH RBC QN AUTO: 28 PG (ref 26–34)
MCH RBC QN AUTO: 28.2 PG (ref 26–34)
MCHC RBC AUTO-ENTMCNC: 32.8 G/DL (ref 31–36)
MCHC RBC AUTO-ENTMCNC: 33.4 G/DL (ref 31–36)
MCV RBC AUTO: 84.4 FL (ref 80–100)
MCV RBC AUTO: 85.4 FL (ref 80–100)
MONOCYTES ABSOLUTE: 0.7 K/UL (ref 0–1.3)
MONOCYTES RELATIVE PERCENT: 11.3 %
NEUTROPHILS ABSOLUTE: 3.4 K/UL (ref 1.7–7.7)
NEUTROPHILS RELATIVE PERCENT: 56.6 %
PDW BLD-RTO: 19.1 % (ref 12.4–15.4)
PDW BLD-RTO: 19.4 % (ref 12.4–15.4)
PERFORMED ON: ABNORMAL
PERFORMED ON: ABNORMAL
PERFORMED ON: NORMAL
PERFORMED ON: NORMAL
PLATELET # BLD: 112 K/UL (ref 135–450)
PLATELET # BLD: 118 K/UL (ref 135–450)
PMV BLD AUTO: 8.6 FL (ref 5–10.5)
PMV BLD AUTO: 8.9 FL (ref 5–10.5)
POTASSIUM REFLEX MAGNESIUM: 4.4 MMOL/L (ref 3.5–5.1)
PROTHROMBIN TIME: 20 SEC (ref 10–13.2)
RBC # BLD: 4.25 M/UL (ref 4–5.2)
RBC # BLD: 4.33 M/UL (ref 4–5.2)
REASON FOR REJECTION: NORMAL
REJECTED TEST: NORMAL
SODIUM BLD-SCNC: 136 MMOL/L (ref 136–145)
WBC # BLD: 5.7 K/UL (ref 4–11)
WBC # BLD: 6.1 K/UL (ref 4–11)

## 2021-05-13 PROCEDURE — 80048 BASIC METABOLIC PNL TOTAL CA: CPT

## 2021-05-13 PROCEDURE — 99152 MOD SED SAME PHYS/QHP 5/>YRS: CPT

## 2021-05-13 PROCEDURE — 85027 COMPLETE CBC AUTOMATED: CPT

## 2021-05-13 PROCEDURE — B2131ZZ FLUOROSCOPY OF MULTIPLE CORONARY ARTERY BYPASS GRAFTS USING LOW OSMOLAR CONTRAST: ICD-10-PCS | Performed by: INTERNAL MEDICINE

## 2021-05-13 PROCEDURE — 85025 COMPLETE CBC W/AUTO DIFF WBC: CPT

## 2021-05-13 PROCEDURE — B2151ZZ FLUOROSCOPY OF LEFT HEART USING LOW OSMOLAR CONTRAST: ICD-10-PCS | Performed by: INTERNAL MEDICINE

## 2021-05-13 PROCEDURE — B2111ZZ FLUOROSCOPY OF MULTIPLE CORONARY ARTERIES USING LOW OSMOLAR CONTRAST: ICD-10-PCS | Performed by: INTERNAL MEDICINE

## 2021-05-13 PROCEDURE — 85610 PROTHROMBIN TIME: CPT

## 2021-05-13 PROCEDURE — 94761 N-INVAS EAR/PLS OXIMETRY MLT: CPT

## 2021-05-13 PROCEDURE — 6370000000 HC RX 637 (ALT 250 FOR IP): Performed by: INTERNAL MEDICINE

## 2021-05-13 PROCEDURE — C1769 GUIDE WIRE: HCPCS

## 2021-05-13 PROCEDURE — 2580000003 HC RX 258

## 2021-05-13 PROCEDURE — 4A023N7 MEASUREMENT OF CARDIAC SAMPLING AND PRESSURE, LEFT HEART, PERCUTANEOUS APPROACH: ICD-10-PCS | Performed by: INTERNAL MEDICINE

## 2021-05-13 PROCEDURE — 93459 L HRT ART/GRFT ANGIO: CPT | Performed by: INTERNAL MEDICINE

## 2021-05-13 PROCEDURE — 94640 AIRWAY INHALATION TREATMENT: CPT

## 2021-05-13 PROCEDURE — 85730 THROMBOPLASTIN TIME PARTIAL: CPT

## 2021-05-13 PROCEDURE — C1894 INTRO/SHEATH, NON-LASER: HCPCS

## 2021-05-13 PROCEDURE — 6360000002 HC RX W HCPCS

## 2021-05-13 PROCEDURE — 93459 L HRT ART/GRFT ANGIO: CPT

## 2021-05-13 PROCEDURE — 99152 MOD SED SAME PHYS/QHP 5/>YRS: CPT | Performed by: INTERNAL MEDICINE

## 2021-05-13 PROCEDURE — 99024 POSTOP FOLLOW-UP VISIT: CPT | Performed by: INTERNAL MEDICINE

## 2021-05-13 PROCEDURE — 2709999900 HC NON-CHARGEABLE SUPPLY

## 2021-05-13 PROCEDURE — 99153 MOD SED SAME PHYS/QHP EA: CPT

## 2021-05-13 PROCEDURE — 2500000003 HC RX 250 WO HCPCS

## 2021-05-13 PROCEDURE — 6360000002 HC RX W HCPCS: Performed by: INTERNAL MEDICINE

## 2021-05-13 PROCEDURE — 2580000003 HC RX 258: Performed by: INTERNAL MEDICINE

## 2021-05-13 PROCEDURE — 36415 COLL VENOUS BLD VENIPUNCTURE: CPT

## 2021-05-13 PROCEDURE — 6360000004 HC RX CONTRAST MEDICATION: Performed by: INTERNAL MEDICINE

## 2021-05-13 RX ORDER — CARVEDILOL 6.25 MG/1
6.25 TABLET ORAL 2 TIMES DAILY WITH MEALS
Qty: 60 TABLET | Refills: 0
Start: 2021-05-13

## 2021-05-13 RX ORDER — IPRATROPIUM BROMIDE AND ALBUTEROL SULFATE 2.5; .5 MG/3ML; MG/3ML
3 SOLUTION RESPIRATORY (INHALATION) EVERY 4 HOURS
Qty: 360 ML | Refills: 0
Start: 2021-05-13

## 2021-05-13 RX ORDER — SODIUM CHLORIDE 0.9 % (FLUSH) 0.9 %
5-40 SYRINGE (ML) INJECTION EVERY 12 HOURS SCHEDULED
Status: DISCONTINUED | OUTPATIENT
Start: 2021-05-13 | End: 2021-05-14 | Stop reason: HOSPADM

## 2021-05-13 RX ORDER — CARVEDILOL 6.25 MG/1
6.25 TABLET ORAL 2 TIMES DAILY WITH MEALS
Status: DISCONTINUED | OUTPATIENT
Start: 2021-05-13 | End: 2021-05-14 | Stop reason: HOSPADM

## 2021-05-13 RX ORDER — HEPARIN SODIUM 10000 [USP'U]/100ML
18 INJECTION, SOLUTION INTRAVENOUS CONTINUOUS
Status: DISCONTINUED | OUTPATIENT
Start: 2021-05-13 | End: 2021-05-14 | Stop reason: HOSPADM

## 2021-05-13 RX ORDER — HEPARIN SODIUM 1000 [USP'U]/ML
40 INJECTION, SOLUTION INTRAVENOUS; SUBCUTANEOUS PRN
Status: DISCONTINUED | OUTPATIENT
Start: 2021-05-13 | End: 2021-05-14 | Stop reason: HOSPADM

## 2021-05-13 RX ORDER — SODIUM CHLORIDE 0.9 % (FLUSH) 0.9 %
5-40 SYRINGE (ML) INJECTION PRN
Status: DISCONTINUED | OUTPATIENT
Start: 2021-05-13 | End: 2021-05-14 | Stop reason: HOSPADM

## 2021-05-13 RX ORDER — ASPIRIN 81 MG/1
81 TABLET, CHEWABLE ORAL DAILY
Qty: 30 TABLET | Refills: 0
Start: 2021-05-14

## 2021-05-13 RX ORDER — ATORVASTATIN CALCIUM 40 MG/1
40 TABLET, FILM COATED ORAL NIGHTLY
Status: DISCONTINUED | OUTPATIENT
Start: 2021-05-13 | End: 2021-05-13

## 2021-05-13 RX ORDER — ACETAMINOPHEN 325 MG/1
650 TABLET ORAL EVERY 4 HOURS PRN
Status: DISCONTINUED | OUTPATIENT
Start: 2021-05-13 | End: 2021-05-14 | Stop reason: HOSPADM

## 2021-05-13 RX ORDER — CARVEDILOL 3.12 MG/1
3.12 TABLET ORAL 2 TIMES DAILY WITH MEALS
Status: DISCONTINUED | OUTPATIENT
Start: 2021-05-13 | End: 2021-05-13

## 2021-05-13 RX ORDER — HEPARIN SODIUM 1000 [USP'U]/ML
80 INJECTION, SOLUTION INTRAVENOUS; SUBCUTANEOUS PRN
Status: DISCONTINUED | OUTPATIENT
Start: 2021-05-13 | End: 2021-05-14 | Stop reason: HOSPADM

## 2021-05-13 RX ORDER — BUDESONIDE AND FORMOTEROL FUMARATE DIHYDRATE 160; 4.5 UG/1; UG/1
2 AEROSOL RESPIRATORY (INHALATION) 2 TIMES DAILY
Qty: 1 INHALER | Refills: 0
Start: 2021-05-13

## 2021-05-13 RX ORDER — ATORVASTATIN CALCIUM 80 MG/1
80 TABLET, FILM COATED ORAL NIGHTLY
Status: DISCONTINUED | OUTPATIENT
Start: 2021-05-13 | End: 2021-05-14 | Stop reason: HOSPADM

## 2021-05-13 RX ORDER — ATORVASTATIN CALCIUM 80 MG/1
80 TABLET, FILM COATED ORAL NIGHTLY
Qty: 30 TABLET | Refills: 0
Start: 2021-05-13

## 2021-05-13 RX ADMIN — Medication 2 PUFF: at 08:57

## 2021-05-13 RX ADMIN — IPRATROPIUM BROMIDE AND ALBUTEROL SULFATE 1 AMPULE: .5; 3 SOLUTION RESPIRATORY (INHALATION) at 08:56

## 2021-05-13 RX ADMIN — CARVEDILOL 6.25 MG: 6.25 TABLET, FILM COATED ORAL at 17:38

## 2021-05-13 RX ADMIN — IPRATROPIUM BROMIDE AND ALBUTEROL SULFATE 1 AMPULE: .5; 3 SOLUTION RESPIRATORY (INHALATION) at 16:21

## 2021-05-13 RX ADMIN — Medication 2 PUFF: at 20:50

## 2021-05-13 RX ADMIN — ASPIRIN 81 MG: 81 TABLET, CHEWABLE ORAL at 09:32

## 2021-05-13 RX ADMIN — IOPAMIDOL 165 ML: 755 INJECTION, SOLUTION INTRAVENOUS at 14:16

## 2021-05-13 RX ADMIN — IPRATROPIUM BROMIDE AND ALBUTEROL SULFATE 1 AMPULE: .5; 3 SOLUTION RESPIRATORY (INHALATION) at 12:05

## 2021-05-13 RX ADMIN — IPRATROPIUM BROMIDE AND ALBUTEROL SULFATE 1 AMPULE: .5; 3 SOLUTION RESPIRATORY (INHALATION) at 20:48

## 2021-05-13 RX ADMIN — HEPARIN SODIUM 18 UNITS/KG/HR: 10000 INJECTION, SOLUTION INTRAVENOUS at 15:54

## 2021-05-13 RX ADMIN — Medication 10 ML: at 20:26

## 2021-05-13 RX ADMIN — ATORVASTATIN CALCIUM 80 MG: 80 TABLET, FILM COATED ORAL at 20:25

## 2021-05-13 RX ADMIN — Medication 10 ML: at 09:32

## 2021-05-13 ASSESSMENT — PAIN SCALES - GENERAL: PAINLEVEL_OUTOF10: 0

## 2021-05-13 NOTE — PROGRESS NOTES
MD Marcela Simmons MD Sydnee Older, MD                Office: [phone]                      Fax: [phone] INPATIENT PROGRESS NOTE:     PATIENT NAME: Maico Shea  : [birth year]  MRN: [id number]      IMPRESSION / RECOMMENDATION:    Admitted for:  Acute respiratory failure with hypoxia and hypercapnia ([address] [J96.01, J96.02] - needing BIPAP to NC now   - f/u ECHO , CXR: Findings consistent with mild congestive failure. - Empirical abx  - f/u after more UF on HD   -Abnormal stress test, follow-up left heart cath, she makes minimal urine, been on dialysis for a while, so okay to proceed  -Monitor for fluid overload, after some \"osmotic load\" with IV contrast,    - HD Friday   - Renally stable       1. ESRD on iHD: MWF.   - outpt HD [facility]: [facility] nephrology) - as per patient   - Access: Left UAAV: (+) T/B  -  S/P HD Monday for solute and volume control   Pre weight: 60.5 kg (bed scale)  Post weight: 58.6 kg (bed scale) - might be her new DW   EDW: 63 kg    2. Hypertension/Volume Status:  - BP HTN - no need for tight control f/u after HD:   - EDW - 63 kg - 60 kg on admission     3. Electrolytes/acid-base:  K: Hyperkalemia - f/u after HD w/ lower K bath   High AG metabolic acidosis: controlled     4. Anemia of renal failure: at goal  - LIBERTAD: not needed     5. BMD:  - Phos: f/u in am labs   - follow iPTH outpt    : Other supportive care :   - Check daily renal function panel with electrolytes-phosphorus  - Strict monitoring of I/Os, daily weight  - Renal feeds/diet  - Current medications reviewed. - Nephrotoxic medications have been discontinued. - Dose adjusted and appropriate. - Dose meds for eGFR <15 mL/min/1.73m2.    - Avoid heavy opioids due to renal failure - may use very low dose dilaudid / fentanyl with close monitoring of CNS and respiratory depression.          Other Problems:  Hospital Problems           Last Modified POA    * (Principal) Acute respiratory failure with hypoxia and hypercapnia ([address] 5/10/2021 Yes    COPD with acute exacerbation ([address] 5/10/2021 Yes    Volume overload 5/10/2021 Yes    ESRD on dialysis ([address] 5/10/2021 Yes    CAD (coronary artery disease) 5/10/2021 Yes    PVD (peripheral vascular disease) ([address] 5/10/2021 Yes    Renal artery stenosis ([address] 5/10/2021 Yes    Obesity (BMI 30-39. 9) 5/10/2021 Yes    DVT (deep venous thrombosis) ([address] 5/10/2021 Yes    Pulmonary embolism ([address] 5/10/2021 Yes    Hyperkalemia 5/10/2021 Yes    Elevated troponin 5/10/2021 Yes    DM2 (diabetes mellitus, type 2) ([address] 5/10/2021 Yes    Aspiration pneumonitis ([address] 5/10/2021 Yes    Dysphagia 5/10/2021 Yes          Please refer to orders. Multiple complex problems. High risk  Discussed with patient, and treatment team  - hospitalist   Thank you for allowing me to participate in this patient's care. Please do not hesitate to contact me with any questions/concerns. We will follow along with you. Char Luna MD       [facility] [address] [place] [address]: [phone] or [address] [employer]  Fax: [phone]      ===========================================  ===========================================  · Subjective:  Patient was seen comfortably sitting up in the bed,   Reported no active complaints,     Waiting [facility] . Past medical, surgical, social and family medial history reviewed by me:     MEDICATIONS reviewed by me:  Prior to Admission Medications:  No current facility-administered medications on file prior to encounter. No current outpatient medications on file prior to encounter. No current facility-administered medications on file prior to encounter. No current outpatient medications on file prior to encounter.        Current Facility-Administered Medications:     ipratropium-albuterol (DUONEB) nebulizer solution 1 ampule, 1 ampule, Inhalation, Q4H Karthik GALVEZ CC    PHYSICAL EXAM:  Patient Vitals for the past 24 hrs:   BP Temp Temp src Pulse Resp SpO2 Height Weight   05/13/21 0900 (!) 113/53 -- -- 84 11 100 % -- --   05/13/21 0857 -- -- -- -- 18 -- -- --   05/13/21 0800 132/64 98.4 °F (36.9 °C) Temporal 75 16 99 % -- --   05/13/21 0700 (!) 120/47 -- -- 71 21 99 % -- --   05/13/21 0500 (!) 124/57 -- -- 79 13 -- -- --   05/13/21 0400 (!) 144/68 97.8 °F (36.6 °C) Temporal 89 25 99 % -- 125 lb 7.1 oz (56.9 kg)   05/13/21 0300 (!) 146/48 -- -- 74 16 -- -- --   05/13/21 0200 (!) 115/57 -- -- 82 21 -- -- --   05/13/21 0100 109/62 -- -- 86 22 -- -- --   05/13/21 0000 (!) 136/58 98.1 °F (36.7 °C) Temporal 83 17 98 % -- --   05/12/21 2300 133/68 -- -- 90 17 -- -- --   05/12/21 2200 (!) 120/51 -- -- 82 16 -- -- --   05/12/21 2100 121/66 -- -- 86 25 -- -- --   05/12/21 2000 (!) 150/58 97.3 °F (36.3 °C) Temporal 83 21 97 % -- --   05/1945 -- -- -- -- 16 99 % -- --   05/12/21 1900 (!) 145/60 -- -- 81 13 100 % -- --   05/12/21 1600 (!) 105/45 -- -- 82 20 98 % -- --   05/12/21 1515 (!) 131/57 97 °F (36.1 °C) -- 84 16 100 % 5' (1.524 m) 126 lb 1.7 oz (57.2 kg)   05/12/21 1323 -- -- -- -- 16 98 % -- --   05/12/21 1110 (!) 129/53 98 °F (36.7 °C) -- 77 22 100 % 5' (1.524 m) 130 lb 8.2 oz (59.2 kg)       Intake/Output Summary (Last 24 hours) at 5/13/2021 0941  Last data filed at 5/13/2021 0900  Gross per 24 hour   Intake 520 ml   Output 2300 ml   Net -1780 ml       Physical Exam  Vitals signs reviewed. Constitutional:       General: She is not in acute distress. Appearance: She is ill-appearing. HENT:      Head: Normocephalic and atraumatic. Right Ear: External ear normal.      Left Ear: External ear normal.      Nose: Nose normal.      Mouth/Throat:      Mouth: Mucous membranes are not dry. Eyes:      General: No scleral icterus. Conjunctiva/sclera: Conjunctivae normal.   Neck:      Musculoskeletal: Normal range of motion and neck supple. Vascular: No JVD. Cardiovascular:      Rate and Rhythm: Normal rate and regular rhythm. Heart sounds: S1 normal and S2 normal.   Pulmonary:      Effort: Respiratory distress present. Breath sounds: Rhonchi present. Wheezes: mild : BIPAP to NC    Abdominal:      General: Bowel sounds are normal.      Palpations: Abdomen is soft. Musculoskeletal:         General: No deformity. Right lower leg: Edema present. Left lower leg: Edema present. Comments: mild   Skin:     General: Skin is warm and dry. Neurological:      Mental Status: She is alert and oriented to person, place, and time. Mental status is at baseline. Psychiatric:         Mood and Affect: Mood normal.         Behavior: Behavior normal.       Vascular Access:        Permanent Vascular access:  Left upper extremity AV . Access site demonstrates: normal thrill/bruit; no signs of pseudoaneurysm, redness, tenderness or discharge        DATA:  Diagnostic tests reviewed for today's visit:    Recent Labs     05/11/21  0509 05/12/21 0433 05/13/21  0356   WBC 6.1 6.9 6.1   HCT 36.9 35.8* 36.2   * 113* 112*     Iron Saturation:  No components found for: PERCENTFE  FERRITIN:  No results found for: FERRITIN  IRON:  No results found for: IRON  TIBC:  No results found for: TIBC    Recent Labs     05/11/21  0509 05/12/21  0433 05/13/21  0355    134* 136   K 4.7 4.8 4.4   CL 97* 96* 96*   CO2 25 26 28   BUN 47* 68* 37*   CREATININE 6.7* 8.4* 5.9*     Recent Labs     05/11/21  0509 05/12/21 0433 05/13/21  0355   CALCIUM 8.9 8.4 8.4     No results for input(s): PH, PCO2, PO2 in the last 72 hours.     Invalid input(s): Betty Elizalde    ABG:  No results found for: PH, PCO2, PO2, HCO3, BE, THGB, TCO2, O2SAT  VBG:    Lab Results   Component Value Date    PHVEN 7.195 05/10/2021    XNQ8SOR 63.6 05/10/2021    BEVEN -4.5 05/10/2021    T8CNVNDU 98 05/10/2021           BELOW MENTIONED RADIOLOGY STUDY RESULTS REVIEWED BY ME:    Xr Chest

## 2021-05-13 NOTE — PLAN OF CARE
Problem: Falls - Risk of:  Goal: Will remain free from falls  Description: Will remain free from falls  5/13/2021 1721 by Virginia Royal RN  Outcome: Ongoing  5/13/2021 1000 by Virginia Royal RN  Outcome: Ongoing  Goal: Absence of physical injury  Description: Absence of physical injury  5/13/2021 1721 by Virginia Royal RN  Outcome: Ongoing  5/13/2021 1000 by Virginia Royal RN  Outcome: Ongoing     Problem: Discharge Planning:  Goal: Discharged to appropriate level of care  Description: Discharged to appropriate level of care  5/13/2021 1721 by Virginia Royal RN  Outcome: Ongoing  5/13/2021 1000 by Virginia Royal RN  Outcome: Ongoing     Problem: Serum Glucose Level - Abnormal:  Goal: Ability to maintain appropriate glucose levels will improve  Description: Ability to maintain appropriate glucose levels will improve  5/13/2021 1721 by Virginia Royal RN  Outcome: Ongoing  5/13/2021 1000 by Virginia Royal RN  Outcome: Ongoing   Pt Blood glucose within normal range, pt on bedrest s/p cath verbalizes understanding

## 2021-05-13 NOTE — PRE SEDATION
millicurie Intravenous ONCE PRN Ramiro Huertas DO        sodium chloride flush 0.9 % injection 5-40 mL  5-40 mL Intravenous 2 times per day Eric Zarate MD   10 mL at 05/12/21 2058    sodium chloride flush 0.9 % injection 5-40 mL  5-40 mL Intravenous PRN Eric Zarate MD        0.9 % sodium chloride infusion  25 mL Intravenous PRN Eric Zarate MD        promethazine (PHENERGAN) tablet 12.5 mg  12.5 mg Oral Q6H PRN Eric Zarate MD        Or    ondansetron (ZOFRAN) injection 4 mg  4 mg Intravenous Q6H PRN Eric Zarate MD        polyethylene glycol (GLYCOLAX) packet 17 g  17 g Oral Daily PRN Eric Zarate MD        acetaminophen (TYLENOL) tablet 650 mg  650 mg Oral Q6H PRN Eric Zarate MD   650 mg at 05/11/21 6172    Or    acetaminophen (TYLENOL) suppository 650 mg  650 mg Rectal Q6H PRN Eric Zarate MD        glucose (GLUTOSE) 40 % oral gel 15 g  15 g Oral PRN Eric Zarate MD        dextrose 50 % IV solution  12.5 g Intravenous PRN Eric Zarate MD        glucagon (rDNA) injection 1 mg  1 mg Intramuscular PRN Eric Zarate MD        dextrose 5 % solution  100 mL/hr Intravenous PRN Eric Zarate MD        insulin lispro (1 Unit Dial) 0-12 Units  0-12 Units Subcutaneous TID WC Eric Zarate MD   Stopped at 05/11/21 0747    insulin lispro (1 Unit Dial) 0-6 Units  0-6 Units Subcutaneous Nightly Eric Zarate MD   1 Units at 05/10/21 2112    aspirin chewable tablet 81 mg  81 mg Oral Daily Eric Zarate MD   81 mg at 05/11/21 0909    budesonide-formoterol (SYMBICORT) 160-4.5 MCG/ACT inhaler 2 puff  2 puff Inhalation BID Eric Zarate MD   2 puff at 05/12/21 1943    perflutren lipid microspheres (DEFINITY) injection 1.65 mg  1.5 mL Intravenous ONCE PRN Eric Zarate MD               Pre-Sedation:    Pre-Sedation Documentation and Exam:  I have assessed the patient and agree with the H&P present on the chart.     Prior History of Anesthesia Complications:   none    Modified Mallampati:  II (soft palate, uvula, fauces visible)    ASA Classification:  Class 3 - A patient with severe systemic disease that limits activity but is not incapacitating      Durga Scale: Activity:  2 - Able to move 4 extremities voluntarily on command  Respiration:  2 - Able to breathe deeply and cough freely  Circulation:  2 - BP+/- 20mmHg of normal  Consciousness:  2 - Fully awake  Oxygen Saturation (color):  2 - Able to maintain oxygen saturation >92% on room air    Sedation/Anesthesia Plan:  Guard the patient's safety and welfare. Minimize physical discomfort and pain. Minimize negative psychological responses to treatment by providing sedation and analgesia and maximize the potential amnesia. Patient to meet pre-procedure discharge plan. Medication Planned:  midazolam intravenously and fentanyl intravenously    Patient is an appropriate candidate for plan of sedation: yes    The risks, benefits, goals, and alternatives of the procedure were discussed in detail with the patient. Informed consent was obtained and further recommendations will be made following the procedure. Miranda Grewal DO, [address] [facility]  Interventional Cardiology     o: [phone]  [address] [place], [place] [address] [address], [address] [facility]      NOTE:  This report was transcribed using voice recognition software. Every effort was made to ensure accuracy; however, inadvertent computerized transcription errors may be present.

## 2021-05-13 NOTE — PROGRESS NOTES
Cardiovascular Progress Note      Chief Complaint:   Chief Complaint   Patient presents with    Shortness of Breath     Pt. arrived to the ED via [facility]. Pt. was headed to dialysis this am and pt. states daughter called 911 because I couldn't breathe. Pt. arrived via non rebreather o2-95% pt. diaphoretic. Pt. able to follow verbal commands     Impression/Recommendations:  75 y/o patient of  Cardiology's Dr. Lorraine Lopez:    NSTEMI  Acute hypoxic respiratory failure  CAD: Hx. CABG (3/2020 LIMA elongated SVG-LAD, SVG Y conduit from LIMA-OM)   ESRD with volume overload  Anemia of chronic disease   PAD: Hx. B/L common iliac stents   Hx. DVT/PE on Coumadin   Hypertension  Hyperlipidemia  Diabetes mellitus   Former tobacco       Dialyzed to dry weight. New ischemic ECG changes in hospital as well as new WMAs by echo. Severe LM and 3 vessel disease with occluded graft supply by angiography today  Continue Aspirin/Statin/Beta blocker   Heparin gtt   Will discuss inpatient transfer to  with her primary cardiologist for multivessel PCI consideration. Interval History:   Pt. S/E. No events overnight. No chest pain/pressure. No shortness of breath. On room air. Troponin 0.13, 0.19, 0.24  133-->125 lbs this admission    5/11/2021 SPECT MPI   Conclusions        Summary    Decreased uptake in the apical segments.    Stress portion cancelled for new precordial and high lateral TWI.        Recommendation    Cardiac catheterization.         5/11/21 TTE  Overall, left ventricular systolic function is borderline. Ejection fraction is visually estimated to be 50%. There is anteroseptal/inferoseptal wall hypokinesis. Normal diastolic filling pattern for age. Mild to moderate mitral regurgitation. Mild to moderate tricuspid regurgitation. Estimated pulmonary artery systolic pressure is elevated at 50 mmHg assuming   a right atrial pressure of 8mmHg.      LHC (pre-CABG)  2/20  CORONARY ARTERIES:   The coronary circulation is right dominant. The left main has a normal course and bifurcates normally into the   LAD and circumflex. The left anterior descending has a normal   course to the anterior AV groove, gives rise to 3 diagonals and 4   septals, and wraps around the apex. The first diagonal has a high   origin. The left circumflex has a normal course and gives rise to 2   obtuse marginals. The right coronary has a normal course and gives   rise to the posterior descending artery, 3 RV marginals, and 1   posterolateral.   Left main: Normal-sized. Moderate diffuse disease and ectatic. Distal vessel lesion: There is a 70% stenosis. LAD:  Normal-sized.  Proximal vessel lesion: There is a 70%   stenosis.  Mid-vessel lesion: There is a 70% stenosis. 1st diagonal:  Ostial lesion: There is a 70% stenosis. 2nd diagonal:  Ostial lesion: There is a 60% stenosis. Left circumflex:  Mid-vessel lesion: There is a 70% stenosis. 1st obtuse marginal:  Normal-sized. 2nd obtuse marginal:  Normal-sized. Right coronary:  Normal-sized.  Ostial lesion: There is a 70%   stenosis.  Mid-vessel lesion: There is a 30% stenosis.      Medications:  ipratropium-albuterol (DUONEB) nebulizer solution 1 ampule, Q4H WA  technetium tetrofosmin (Tc-MYOVIEW) injection 30 millicurie, ONCE PRN  sodium chloride flush 0.9 % injection 5-40 mL, 2 times per day  sodium chloride flush 0.9 % injection 5-40 mL, PRN  0.9 % sodium chloride infusion, PRN  promethazine (PHENERGAN) tablet 12.5 mg, Q6H PRN    Or  ondansetron (ZOFRAN) injection 4 mg, Q6H PRN  polyethylene glycol (GLYCOLAX) packet 17 g, Daily PRN  acetaminophen (TYLENOL) tablet 650 mg, Q6H PRN    Or  acetaminophen (TYLENOL) suppository 650 mg, Q6H PRN  glucose (GLUTOSE) 40 % oral gel 15 g, PRN  dextrose 50 % IV solution, PRN  glucagon (rDNA) injection 1 mg, PRN  dextrose 5 % solution, PRN  insulin lispro (1 Unit Dial) 0-12 Units, TID WC  insulin lispro (1 Unit Dial) 0-6 Units, Nightly  aspirin chewable tablet 81 mg, Daily  budesonide-formoterol (SYMBICORT) 160-4.5 MCG/ACT inhaler 2 puff, BID  perflutren lipid microspheres (DEFINITY) injection 1.65 mg, ONCE PRN        I/O:     Intake/Output Summary (Last 24 hours) at 5/13/2021 0852  Last data filed at 5/12/2021 1515  Gross per 24 hour   Intake 400 ml   Output 2300 ml   Net -1900 ml       Physical Exam:    BP (!) 120/47   Pulse 71   Temp 97.8 °F (36.6 °C) (Temporal)   Resp 21   Ht 5' (1.524 m)   Wt 125 lb 7.1 oz (56.9 kg)   SpO2 99%   BMI 24.50 kg/m²   Wt Readings from Last 3 Encounters:   05/13/21 125 lb 7.1 oz (56.9 kg)       GENERAL: Well developed, well nourished, no acute distress  NEUROLOGICAL: Alert and oriented x3  PSYCH: Normal mood and affect   SKIN: Warm and dry, without lesions  HEENT: Normocephalic, atraumatic, Sclera non-icteric, mucous membranes moist  NECK: supple, JVP normal, thyroid not enlarged   CAROTID: Normal upstroke, no bruits  CARDIAC: Normal PMI, regular rate and rhythm, normal S1S2, no murmur, rub  RESPIRATORY: Normal respiratory effort, clear to auscultation bilaterally  EXTREMITIES: No cyanosis, clubbing or edema, palpable pulses bilaterally   MUSCULOSKELETAL: No joint swelling or tenderness, no chest wall tenderness  GASTROINTESTINAL:  soft, non-tender, no bruit    Data Review:  CBC:   Recent Labs     05/11/21  0509 05/12/21  0433 05/13/21  0356   WBC 6.1 6.9 6.1   HGB 11.9* 11.6* 11.9*   HCT 36.9 35.8* 36.2   MCV 86.1 86.4 85.4   * 113* 112*     BMP:   Recent Labs     05/11/21  0509 05/12/21  0433 05/13/21  0355    134* 136   K 4.7 4.8 4.4   CL 97* 96* 96*   CO2 25 26 28   BUN 47* 68* 37*   CREATININE 6.7* 8.4* 5.9*   GFRAA 7* 6* 8*   Cardiac Enzymes:   Recent Labs     05/10/21  0949 05/10/21  1616 05/10/21  2221   TROPONINI 0.13* 0.19* 0.24*     PT/INR:   Recent Labs     05/11/21  0509 05/12/21  0433 05/13/21  0356   PROTIME 20.1* 32.0* 20.0*   INR 1.72* 2.73* 1.71*     [facility] Fredi García DO, [facility]  Interventional Cardiology     o: [phone]  [address] [facility] [address] [facility], [address] [facility]      NOTE:  This report was transcribed using voice recognition software. Every effort was made to ensure accuracy; however, inadvertent computerized transcription errors may be present.

## 2021-05-13 NOTE — PLAN OF CARE
Problem: Falls - Risk of:  Goal: Will remain free from falls  Description: Will remain free from falls  Outcome: Ongoing  Goal: Absence of physical injury  Description: Absence of physical injury  Outcome: Ongoing     Problem: Discharge Planning:  Goal: Discharged to appropriate level of care  Description: Discharged to appropriate level of care  Outcome: Ongoing     Problem: Serum Glucose Level - Abnormal:  Goal: Ability to maintain appropriate glucose levels will improve  Description: Ability to maintain appropriate glucose levels will improve  Outcome: Ongoing   Pt with improved glucose control, remains free from falls

## 2021-05-13 NOTE — PROGRESS NOTES
See flow sheet for VS. NSR. Assess complete. See flow sheet. Discussed POC for this shift. BG 90 no coverage indicated. Snack of PB and solis crackers. Denies any other needs at this time. Will monitor.

## 2021-05-13 NOTE — DISCHARGE SUMMARY
Hospital Medicine Discharge Summary    Patient: Brittney Brice     Gender: female  : 1945   Age: 76 y.o. MRN: [id number]    Admitting Physician: Rayna Reed MD  Discharge Physician: Rayna Reed MD     Code Status: Full Code     Admit Date: 5/10/2021   Discharge Date:   21    Disposition:  [facility] CV ICU under Dr Jillian Hull ([address])    Discharge Diagnoses: Active Hospital Problems    Diagnosis Date Noted    Acute respiratory failure with hypoxia and hypercapnia (HCC) [J96.01, J96.02] 05/10/2021    COPD with acute exacerbation (HCC) [J44.1] 05/10/2021    Volume overload [E87.70] 05/10/2021    ESRD on dialysis ([facility] [address] [N18.6, Z99.2] 05/10/2021    CAD (coronary artery disease) [I25.10] 05/10/2021    PVD (peripheral vascular disease) ([facility] [address] [I73.9] 05/10/2021    Renal artery stenosis (HCC) [I70.1] 05/10/2021    Obesity (BMI 30-39. 9) [E66.9] 05/10/2021    DVT (deep venous thrombosis) ([facility]) [I82.409] 05/10/2021    Pulmonary embolism ([facility] [address] [I26.99] 05/10/2021    Hyperkalemia [E87.5] 05/10/2021    Elevated troponin [R77.8] 05/10/2021    DM2 (diabetes mellitus, type 2) ([facility] [address] [E11.9] 05/10/2021    Aspiration pneumonitis ([facility] [address] [J69.0] 05/10/2021    Dysphagia [R13.10] 05/10/2021       Follow-up appointments:  one week    Outpatient to do list: F/U with PCP, Cardio and Renal    Condition at Discharge:  Candy Pabloo Yovani Course:   76 y. o. female with historyt of CAD s/p CABG, PAD s/p L MIMA s/p angio and stent, PVD s/p BL common iliac artery stents, ESRD on HD, DVT/PE on Coumadin, HTN, hypothyroidism, GERD, obesity who came to ER with SOB.    Patient has been SOB for past few days.  Daughter called EMS as patient getting ready to go to HD. Luana Mantilla was in [place] acute respiratory failure with hypoxia and hypercapnia on arrival to ED.  Patient was on 100% NRB.  Patient continued to struggle with SOB in ED and placed on Bipap as she was hypercapnic.  Rapid COVID mL, Refills: 0           Current Discharge Medication List        Current Discharge Medication List        Current Discharge Medication List            Discharge Exam:    /61   Pulse 84   Temp 97.9 °F (36.6 °C) (Temporal)   Resp 18   Ht 5' (1.524 m)   Wt 125 lb 7.1 oz (56.9 kg)   SpO2 96%   BMI 24.50 kg/m²   General appearance:  Appears comfortable. Well nourished, NAD  Eyes: Sclera clear, pupils equal  ENT: Moist mucus membranes, no thrush. Trachea midline. Cardiovascular: Regular rhythm, normal S1, S2. No murmur, gallop, rub. No edema in lower extremities  Respiratory: No rales. No wheezing or rhonchi. .  Gastrointestinal: Abdomen soft, non-tender, not distended, normal bowel sounds  Musculoskeletal: LUE AVF  Neurology: Grossly intact. Alert and oriented in time, place and person. No speech or motor deficits  Psychiatry: Appropriate affect. Not agitated  Skin: Warm, dry, normal turgor, no rash  Brisk capillary refill, peripheral pulses palpable     Labs:  For convenience and continuity at follow-up the following most recent labs are provided:    Lab Results   Component Value Date    WBC 6.1 05/13/2021    HGB 11.9 05/13/2021    HCT 36.2 05/13/2021    MCV 85.4 05/13/2021     05/13/2021     05/13/2021    K 4.4 05/13/2021    CL 96 05/13/2021    CO2 28 05/13/2021    BUN 37 05/13/2021    CREATININE 5.9 05/13/2021    CALCIUM 8.4 05/13/2021    ALKPHOS 95 05/10/2021    ALT 21 05/10/2021    AST 35 05/10/2021    BILITOT 0.4 05/10/2021    LABALBU 4.2 05/10/2021     Lab Results   Component Value Date    INR 1.71 (H) 05/13/2021    INR 2.73 (H) 05/12/2021    INR 1.72 (H) 05/11/2021       Radiology:  Echo Complete    Result Date: 5/11/2021  Transthoracic Echocardiography Report (TTE)  Demographics   Patient Name       Nguyen Brochure   Date of Study      05/11/2021        Gender              Female   Patient Number     [id number]        Date of Birth       1945   Visit Number       [id number] Age                 76 year(s)   Accession Number   [id number]        Room Number         8156   Corporate ID       [id number]           [address] [place] [address] Autumn Rm RDCS, MS   Ordering Physician Niki Pretty MD  Interpreting        Physician SHAWN Espinosa [address]  Procedure Type of Study   TTE procedure:ECHOCARDIOGRAM COMPLETE 2D W DOPPLER W COLOR. Procedure Date Date: 05/11/2021 Start: 10:39 AM Study Location: [facility] - Echo Lab Technical Quality: Adequate visualization Indications:Dyspnea/SOB and Chest pain. Patient Status: Routine Height: 60 inches Weight: 133 pounds BSA: 1.57 m2 BMI: 25.97 kg/m2 HR: 75 bpm BP: 163/82 mmHg  Conclusions   Summary  Overall, left ventricular systolic function is borderline. Ejection fraction is visually estimated to be 50%. There is anteroseptal/inferoseptal wall hypokinesis. Normal diastolic filling pattern for age. Mild to moderate mitral regurgitation. Mild to moderate tricuspid regurgitation. Estimated pulmonary artery systolic pressure is elevated at 50 mmHg assuming  a right atrial pressure of 8mmHg. Signature   ------------------------------------------------------------------  Electronically signed by Fareed Joyce [address]  (Interpreting physician) on 05/11/2021 at 04:06 PM  ------------------------------------------------------------------   Findings   Left Ventricle  Overall, left ventricular systolic function is borderline. Ejection fraction is visually estimated to be 50%. E/e'= 12.3  There is anteroseptal/inferoseptal wall hypokinesis. Normal diastolic filling pattern for age. Mitral Valve  Mild thickening of anterior leaflet of mitral valve. Mild to moderate mitral regurgitation. The mitral leaflets appear myxomatous. Left Atrium  The left atrium is mildly dilated.    Aortic Valve  Aortic valve appears sclerotic but opens adequately. Aorta  The aortic root is normal in size. Right Ventricle  The right ventricle function is mildly reduced. TAPSE= 1.50 RV S'= 9.03   Tricuspid Valve  Mild to moderate tricuspid regurgitation. Estimated pulmonary artery systolic pressure is elevated at 50 mmHg assuming  a right atrial pressure of 8mmHg. Right Atrium  The right atrial size is normal.   Pulmonic Valve  The pulmonic valve is not well visualized. There is no evidence of pulmonic valve regurgitation or stenosis. Pericardial Effusion  No pericardial effusion noted. Pleural Effusion  No pleural effusion. Miscellaneous  IVC size is dilated (>2.1 cm) but collapses > 50% with respiration  consistent with elevated RA pressure (8 mmHg).   M-Mode/2D Measurements (cm)   LV Diastolic Dimension: 4.4 cm LV Systolic Dimension: 6.66 cm  LV Septum Diastolic: 5.37 cm  LV PW Diastolic: 2.09 cm                                 LA Dimension: 4.2 cm                                 LA Area: 12.8 cm2  LVOT: 1.9 cm                   LA volume/Index: 29.2 ml /19 ml/m2  Doppler Measurements   AV Peak Velocity: 134 cm/s     MV Peak E-Wave: 117 cm/s  AV Peak Gradient: 7.18 mmHg    MV Peak A-Wave: 102 cm/s  AV Mean Gradient: 4 mmHg       MV E/A Ratio: 1.15  LVOT Peak Velocity: 85.5 cm/s  MV P1/2t: 57 msec  AV Area (Continuity):1.84 cm2  MV Mean Gradient: 2 mmHg                                 MV Max P mmHg  TR Velocity:322 cm/s           MV Vmax:425 cm/s  TR Gradient:41.47 mmHg         MV VTI:44.3 cm/s  Estimated RAP:8 mmHg  Estimated RVSP: 52 mmHg        MV Area (continuity): 1.49 cm2  E' Septal Velocity: 9.79 cm/s  MV Deceleration Time: 196 msec  E' Lateral Velocity: 9.36 cm/s MV Area (PHT): 3.86 cm2  E/E' ratio: 12.3   Aortic Valve   Peak Velocity: 134 cm/s     Mean Velocity: 96.5 cm/s  Peak Gradient: 7.18 mmHg    Mean Gradient: 4 mmHg  Area (continuity): 1.84 cm2  AV VTI: 35.8 cm  Aorta   LVOT Diameter: 1.9 cm      Xr Chest Portable    Result Date: 5/10/2021  EXAMINATION: ONE XRAY VIEW OF THE CHEST 5/10/2021 6:09 am COMPARISON: 08/04/2010 HISTORY: ORDERING SYSTEM PROVIDED HISTORY: SOB TECHNOLOGIST PROVIDED HISTORY: Reason for exam:->SOB FINDINGS: Heart size is upper limits of normal and there is pulmonary vascular congestion. There may be mild bibasilar interstitial edema. No pleural effusion or pneumothorax. No consolidating infiltrate. Minimal fluid in the right minor fissure. Surrounding osseous and soft tissue structures are noted for prior median sternotomy for CABG. Findings consistent with mild congestive failure. Nm Cardiac Stress Test Nuclear Imaging    Result Date: 5/11/2021  Cardiac Perfusion Imaging  Demographics   Patient Name       Avelino Thomas   Date of Study      05/11/2021         Gender              Female   Patient Number     [id number]         Date of Birth       1945   Visit Number       [id number]          Age                 76 year(s)   Accession Number   [id number]         Room Number         9949   Corporate ID       [id number]            NM Technician       Catia Hoover, RT   Nurse                                 Interpreting        [facility]                                        Physician           Liz Browne DO, [address] [facility]) Physician Haroon Bower MD, [facility]   The procedure was explained in detail to the patient. Risks,  complications and alternative treatments were reviewed. Written consent  was obtained. Procedure Procedure Type:   Nuclear Stress Test:NM MYOCARDIAL SPECT SINGLE   Study location: [facility],4Th Floor Unit Medicine   Indications: Shortness of breath and Chest pain. Hospital Status: Inpatient.   Height: 60 inches Weight: 133 pounds  Risk Factors   The patient risk factors include:prior PCI;prior CABG;physical activity,  former tobacco use, hypercholesterolemia, hypertension, chronic lung  disease, dyslipidemia, renal failure currently treated with dialysis and (  years not smoking: 15). Conclusions   Summary  Decreased uptake in the apical segments. Stress portion cancelled for new precordial and high lateral TWI. Recommendation  Cardiac catheterization. Stress Protocols  Predicted HR: 145 bpm   Imaging Protocols   - One Day   Rest   Isotope:Myoview/Tetrofosmin  Isotope dose:9.38 mCi  Administration Route:I.V.  Date:05/11/2021 10:22  Medical History  Signatures   ------------------------------------------------------------------  Electronically signed by Pooja Chaudhary DO, [facility]  (Interpreting physician) on 05/11/2021 at 14:34  ------------------------------------------------------------------        The patient was seen and examined on day of discharge and this discharge summary is in conjunction with any daily progress note from day of discharge. Time Spent on discharge is 45 minutes  in the examination, evaluation, counseling and review of medications and discharge plan. Note that more than 30 minutes was spent in preparing discharge papers, discussing discharge with patient, medication review, etc.       Signed:    Carmelo Masters MD   5/13/2021      Thank you Tereza Buitrago for the opportunity to be involved in this patient's care.  If you have any questions or concerns please feel free to contact me at [address] [place] [address]

## 2021-05-13 NOTE — ACP (ADVANCE CARE PLANNING)
Advanced Care Planning Note. Purpose of Encounter: Advanced care planning in light of CAD  Parties In Attendance: Patient, daughter Nancy Sebastian on phone   Decisional Capacity: Yes  Subjective: Patient denies CP or SOB  Objective: Cr 5.9  Goals of Care Determination: Patient wants full support (CPR, vent, surgery, HD, trach, PEG)  Plan:  LHC. Hep gtt. Cardio and Renal consults. Transfer to [facility] DR SURESH KNUTSON for tertiary management of CAD  Code Status: Full code  Time spent on Advanced care Plannin minutes  Advanced Care Planning Documents: Completed advanced directives on chart, daughter is the POA.     Rayna Reed MD  2021 4:47 PM

## 2021-05-13 NOTE — OP NOTE
Cardiac Catheterization     Procedure: LHC, LVG, Selective graft angiography  Complications: None  Medications: Procedural sedation with minimal conscious sedation (1.5 Versed/75 Fentanyl)  An independent trained observer pushed medications at my direction. We monitored the patient's level of consciousness and vital signs/physiologic status throughout the procedure duration (see start and stop times in log). Estimated Blood Loss: Less than 20 mL  Specimens: were not obtained  Access:  US guided 5 Fr Right Femoral  Closure: Manual  Contrast:  165 cc  Start time: 1230  Stop time: 4680  Fluoro time: 21.1  Findings:     Left Heart Cath  Dominance:Right     LM: 90-95% distal stenosis is calcified   LAD: 90-95% ostial with mild to moderately calcification; small first diagonal with 90% mid disease , 70% mid to distal LAD   LCx:85-90% mid stenosis  RCA: mild to moderately calcified proximal to mid 70% plaque disease; 90% eccentric distal lesion      LIMA occluded proximally prior to anastomosis that was reported as elongated SVG-LAD, SVG Y conduit from LIMA-OM    LVEDP: 15 mmHg  LVEF: 50%  Impression/Recommendations:  Severe LM and 3 vessel disease with occluded graft supply   Continue Aspirin/Statin/Beta blocker   Heparin gtt   Will discuss inpatient transfer to  with her primary cardiologist for complex, multivessel PCI consideration.        Dawson Hauser DO, [facility]  Interventional Cardiology     o: [phone]  [address], [address] [facility]

## 2021-06-09 PROBLEM — R77.8 ELEVATED TROPONIN: Status: RESOLVED | Noted: 2021-05-10 | Resolved: 2021-06-09
